# Patient Record
Sex: MALE | Race: WHITE | NOT HISPANIC OR LATINO | Employment: FULL TIME | URBAN - METROPOLITAN AREA
[De-identification: names, ages, dates, MRNs, and addresses within clinical notes are randomized per-mention and may not be internally consistent; named-entity substitution may affect disease eponyms.]

---

## 2017-02-07 ENCOUNTER — ANESTHESIA EVENT (OUTPATIENT)
Dept: GASTROENTEROLOGY | Facility: CLINIC | Age: 52
End: 2017-02-07
Payer: COMMERCIAL

## 2017-02-07 NOTE — ANESTHESIA PREPROCEDURE EVALUATION
Anesthesia Evaluation     . Pt has had prior anesthetic. Type: General and MAC    No history of anesthetic complications     ROS/MED HX    ENT/Pulmonary:     (+)Intermittent asthma , . .    Neurologic:  - neg neurologic ROS     Cardiovascular:     (+) Dyslipidemia, hypertension----. : . . . :. .       METS/Exercise Tolerance:     Hematologic:         Musculoskeletal: Comment: Cervicalgia  Achilles tendonitis  Plantar fascial fibromatosis        GI/Hepatic:  - neg GI/hepatic ROS       Renal/Genitourinary:  - ROS Renal section negative       Endo:  - neg endo ROS       Psychiatric:  - neg psychiatric ROS       Infectious Disease:  - neg infectious disease ROS       Malignancy:      - no malignancy   Other:               Physical Exam  Normal systems: cardiovascular, pulmonary and dental    Airway   Mallampati: II  TM distance: >3 FB  Neck ROM: full    Dental     Cardiovascular       Pulmonary                     Anesthesia Plan      History & Physical Review  History and physical reviewed and following examination; no interval change.    ASA Status:  2 .    NPO Status:  > 4 hours    Plan for MAC Reason for MAC:  Deep or markedly invasive procedure (G8)         Postoperative Care      Consents  Anesthetic plan, risks, benefits and alternatives discussed with:  Patient..                          .

## 2017-02-13 ENCOUNTER — SURGERY (OUTPATIENT)
Age: 52
End: 2017-02-13

## 2017-02-13 ENCOUNTER — ANESTHESIA (OUTPATIENT)
Dept: GASTROENTEROLOGY | Facility: CLINIC | Age: 52
End: 2017-02-13
Payer: COMMERCIAL

## 2017-02-13 ENCOUNTER — HOSPITAL ENCOUNTER (OUTPATIENT)
Facility: CLINIC | Age: 52
Discharge: HOME | End: 2017-02-13
Attending: SURGERY | Admitting: SURGERY
Payer: COMMERCIAL

## 2017-02-13 VITALS
HEIGHT: 67 IN | OXYGEN SATURATION: 96 % | RESPIRATION RATE: 16 BRPM | WEIGHT: 166 LBS | TEMPERATURE: 98.2 F | DIASTOLIC BLOOD PRESSURE: 100 MMHG | HEART RATE: 72 BPM | BODY MASS INDEX: 26.06 KG/M2 | SYSTOLIC BLOOD PRESSURE: 144 MMHG

## 2017-02-13 LAB — COLONOSCOPY: NORMAL

## 2017-02-13 PROCEDURE — 25800025 ZZH RX 258: Performed by: NURSE ANESTHETIST, CERTIFIED REGISTERED

## 2017-02-13 PROCEDURE — 45385 COLONOSCOPY W/LESION REMOVAL: CPT | Performed by: SURGERY

## 2017-02-13 PROCEDURE — 37000008 ZZH ANESTHESIA TECHNICAL FEE, 1ST 30 MIN: Performed by: SURGERY

## 2017-02-13 PROCEDURE — 45381 COLONOSCOPY SUBMUCOUS NJX: CPT | Mod: PT | Performed by: SURGERY

## 2017-02-13 PROCEDURE — 25000125 ZZHC RX 250: Performed by: NURSE ANESTHETIST, CERTIFIED REGISTERED

## 2017-02-13 PROCEDURE — 25000125 ZZHC RX 250: Performed by: SURGERY

## 2017-02-13 PROCEDURE — 88305 TISSUE EXAM BY PATHOLOGIST: CPT | Performed by: SURGERY

## 2017-02-13 PROCEDURE — 88305 TISSUE EXAM BY PATHOLOGIST: CPT | Mod: 26 | Performed by: SURGERY

## 2017-02-13 PROCEDURE — 45385 COLONOSCOPY W/LESION REMOVAL: CPT | Mod: PT | Performed by: SURGERY

## 2017-02-13 PROCEDURE — 45381 COLONOSCOPY SUBMUCOUS NJX: CPT | Performed by: SURGERY

## 2017-02-13 RX ORDER — PROPOFOL 10 MG/ML
INJECTION, EMULSION INTRAVENOUS PRN
Status: DISCONTINUED | OUTPATIENT
Start: 2017-02-13 | End: 2017-02-13

## 2017-02-13 RX ORDER — SODIUM CHLORIDE, SODIUM LACTATE, POTASSIUM CHLORIDE, CALCIUM CHLORIDE 600; 310; 30; 20 MG/100ML; MG/100ML; MG/100ML; MG/100ML
INJECTION, SOLUTION INTRAVENOUS CONTINUOUS
Status: DISCONTINUED | OUTPATIENT
Start: 2017-02-13 | End: 2017-02-13 | Stop reason: HOSPADM

## 2017-02-13 RX ORDER — PROPOFOL 10 MG/ML
INJECTION, EMULSION INTRAVENOUS CONTINUOUS PRN
Status: DISCONTINUED | OUTPATIENT
Start: 2017-02-13 | End: 2017-02-13

## 2017-02-13 RX ORDER — LIDOCAINE 40 MG/G
CREAM TOPICAL
Status: DISCONTINUED | OUTPATIENT
Start: 2017-02-13 | End: 2017-02-13 | Stop reason: HOSPADM

## 2017-02-13 RX ORDER — ONDANSETRON 2 MG/ML
4 INJECTION INTRAMUSCULAR; INTRAVENOUS
Status: DISCONTINUED | OUTPATIENT
Start: 2017-02-13 | End: 2017-02-13 | Stop reason: HOSPADM

## 2017-02-13 RX ORDER — LIDOCAINE HYDROCHLORIDE 10 MG/ML
INJECTION, SOLUTION INFILTRATION; PERINEURAL PRN
Status: DISCONTINUED | OUTPATIENT
Start: 2017-02-13 | End: 2017-02-13

## 2017-02-13 RX ADMIN — PROPOFOL 50 MG: 10 INJECTION, EMULSION INTRAVENOUS at 08:06

## 2017-02-13 RX ADMIN — SODIUM CHLORIDE, POTASSIUM CHLORIDE, SODIUM LACTATE AND CALCIUM CHLORIDE: 600; 310; 30; 20 INJECTION, SOLUTION INTRAVENOUS at 08:02

## 2017-02-13 RX ADMIN — PROPOFOL 200 MCG/KG/MIN: 10 INJECTION, EMULSION INTRAVENOUS at 08:06

## 2017-02-13 RX ADMIN — LIDOCAINE HYDROCHLORIDE 1 ML: 10 INJECTION, SOLUTION INFILTRATION; PERINEURAL at 07:22

## 2017-02-13 RX ADMIN — PROPOFOL 50 MG: 10 INJECTION, EMULSION INTRAVENOUS at 08:05

## 2017-02-13 RX ADMIN — LIDOCAINE HYDROCHLORIDE 50 MG: 10 INJECTION, SOLUTION INFILTRATION; PERINEURAL at 08:05

## 2017-02-13 NOTE — ANESTHESIA CARE TRANSFER NOTE
Patient: Anthony De La Torre    Procedure(s):  Colonoscopy - Wound Class: II-Clean Contaminated   - Wound Class: II-Clean Contaminated    Diagnosis: screening  Diagnosis Additional Information: No value filed.    Anesthesia Type:   MAC     Note:  Airway :Room Air  Patient transferred to:Phase II  Comments: Patient's VSS. Spontaneous respirations. Patient awake and oriented. IV patent. Report to RN.      Vitals: (Last set prior to Anesthesia Care Transfer)    CRNA VITALS  2/13/2017 0758 - 2/13/2017 0828      2/13/2017             Pulse: 83    SpO2: 94 %                Electronically Signed By: NATASHA Zarate CRNA  February 13, 2017  8:28 AM

## 2017-02-13 NOTE — ANESTHESIA POSTPROCEDURE EVALUATION
Patient: Anthony De La Torre    Procedure(s):  Colonoscopy - Wound Class: II-Clean Contaminated   - Wound Class: II-Clean Contaminated    Diagnosis:screening  Diagnosis Additional Information: No value filed.    Anesthesia Type:  MAC    Note:  Anesthesia Post Evaluation    Patient location during evaluation: Phase 2 and Bedside  Patient participation: Able to fully participate in evaluation  Level of consciousness: awake and alert  Pain management: adequate  Airway patency: patent  Cardiovascular status: acceptable  Respiratory status: acceptable  Hydration status: acceptable  PONV: none     Anesthetic complications: None          Last vitals:  Vitals:    02/13/17 0659 02/13/17 0834   BP: 134/89 113/83   Pulse: 91 76   Resp: 18 16   Temp: 36.8  C (98.2  F)    SpO2: 99% 96%         Electronically Signed By: NATASHA Zarate CRNA  February 13, 2017  8:55 AM

## 2017-02-14 LAB — COPATH REPORT: NORMAL

## 2017-04-06 DIAGNOSIS — I10 ESSENTIAL HYPERTENSION WITH GOAL BLOOD PRESSURE LESS THAN 140/90: ICD-10-CM

## 2017-04-06 NOTE — TELEPHONE ENCOUNTER
losartan      Last Written Prescription Date: 6/20/16  Last Fill Quantity: 90, # refills: 2  Last Office Visit with [facility], [facility] or [facility] prescribing provider: 6/20/16         Potassium   Date Value Ref Range Status   06/20/2016 4.0 3.4 - 5.3 mmol/L Final     Creatinine   Date Value Ref Range Status   06/20/2016 0.64 (L) 0.66 - 1.25 mg/dL Final     BP Readings from Last 3 Encounters:   02/13/17 (!) 144/100   06/20/16 124/82   03/28/16 118/80

## 2017-04-11 RX ORDER — LOSARTAN POTASSIUM 100 MG/1
100 TABLET ORAL DAILY
Qty: 90 TABLET | Refills: 0 | Status: SHIPPED | OUTPATIENT
Start: 2017-04-11 | End: 2017-06-19

## 2017-04-11 NOTE — TELEPHONE ENCOUNTER
Prescription approved per [facility] Refill Protocol. Will need labs in June.  Cecil Burgess RN

## 2017-05-28 DIAGNOSIS — E78.5 HYPERLIPIDEMIA WITH TARGET LDL LESS THAN 130: Primary | ICD-10-CM

## 2017-05-28 DIAGNOSIS — I10 ESSENTIAL HYPERTENSION WITH GOAL BLOOD PRESSURE LESS THAN 140/90: ICD-10-CM

## 2017-05-30 NOTE — TELEPHONE ENCOUNTER
HYDROCHLOROTHIAZIDE TAB 25MG        Last Written Prescription Date: 11/28/16  Last Fill Quantity: 90, # refills: 1  Last Office Visit with G, P or [facility] prescribing provider: 6/20/16       Potassium   Date Value Ref Range Status   06/20/2016 4.0 3.4 - 5.3 mmol/L Final     Creatinine   Date Value Ref Range Status   06/20/2016 0.64 (L) 0.66 - 1.25 mg/dL Final     BP Readings from Last 3 Encounters:   02/13/17 (!) 144/100   06/20/16 124/82   03/28/16 118/80

## 2017-05-31 RX ORDER — HYDROCHLOROTHIAZIDE 25 MG/1
TABLET ORAL
Qty: 90 TABLET | Refills: 0 | Status: SHIPPED | OUTPATIENT
Start: 2017-05-31 | End: 2017-06-19

## 2017-06-19 ENCOUNTER — OFFICE VISIT (OUTPATIENT)
Dept: FAMILY MEDICINE | Facility: CLINIC | Age: 52
End: 2017-06-19
Payer: COMMERCIAL

## 2017-06-19 ENCOUNTER — TELEPHONE (OUTPATIENT)
Dept: FAMILY MEDICINE | Facility: CLINIC | Age: 52
End: 2017-06-19

## 2017-06-19 VITALS
TEMPERATURE: 98.5 F | DIASTOLIC BLOOD PRESSURE: 88 MMHG | HEART RATE: 80 BPM | WEIGHT: 168.3 LBS | BODY MASS INDEX: 26.42 KG/M2 | HEIGHT: 67 IN | SYSTOLIC BLOOD PRESSURE: 144 MMHG

## 2017-06-19 DIAGNOSIS — M19.90 ARTHRITIS: Primary | ICD-10-CM

## 2017-06-19 DIAGNOSIS — N52.02 CORPORO-VENOUS OCCLUSIVE ERECTILE DYSFUNCTION: ICD-10-CM

## 2017-06-19 DIAGNOSIS — E78.5 HYPERLIPIDEMIA WITH TARGET LDL LESS THAN 130: ICD-10-CM

## 2017-06-19 DIAGNOSIS — I10 ESSENTIAL HYPERTENSION WITH GOAL BLOOD PRESSURE LESS THAN 140/90: ICD-10-CM

## 2017-06-19 DIAGNOSIS — M25.562 ACUTE PAIN OF LEFT KNEE: ICD-10-CM

## 2017-06-19 LAB — ERYTHROCYTE [SEDIMENTATION RATE] IN BLOOD BY WESTERGREN METHOD: 5 MM/H (ref 0–20)

## 2017-06-19 PROCEDURE — 36415 COLL VENOUS BLD VENIPUNCTURE: CPT | Performed by: FAMILY MEDICINE

## 2017-06-19 PROCEDURE — 86431 RHEUMATOID FACTOR QUANT: CPT | Performed by: FAMILY MEDICINE

## 2017-06-19 PROCEDURE — 86038 ANTINUCLEAR ANTIBODIES: CPT | Performed by: FAMILY MEDICINE

## 2017-06-19 PROCEDURE — 85652 RBC SED RATE AUTOMATED: CPT | Performed by: FAMILY MEDICINE

## 2017-06-19 PROCEDURE — 99396 PREV VISIT EST AGE 40-64: CPT | Performed by: FAMILY MEDICINE

## 2017-06-19 PROCEDURE — 86618 LYME DISEASE ANTIBODY: CPT | Performed by: FAMILY MEDICINE

## 2017-06-19 RX ORDER — AMLODIPINE BESYLATE 2.5 MG/1
2.5 TABLET ORAL DAILY
Qty: 90 TABLET | Refills: 3 | Status: SHIPPED | OUTPATIENT
Start: 2017-06-19 | End: 2019-06-19

## 2017-06-19 RX ORDER — SILDENAFIL CITRATE 20 MG/1
TABLET ORAL
Qty: 90 TABLET | Refills: 0 | Status: SHIPPED | OUTPATIENT
Start: 2017-06-19 | End: 2018-06-18

## 2017-06-19 RX ORDER — ALBUTEROL SULFATE 90 UG/1
2 AEROSOL, METERED RESPIRATORY (INHALATION) EVERY 6 HOURS PRN
Qty: 1 INHALER | Refills: 6 | Status: SHIPPED | OUTPATIENT
Start: 2017-06-19 | End: 2024-08-29

## 2017-06-19 RX ORDER — LOSARTAN POTASSIUM 100 MG/1
100 TABLET ORAL DAILY
Qty: 90 TABLET | Refills: 3 | Status: SHIPPED | OUTPATIENT
Start: 2017-06-19 | End: 2019-06-19

## 2017-06-19 RX ORDER — ROSUVASTATIN CALCIUM 10 MG/1
5 TABLET, COATED ORAL DAILY
Qty: 45 TABLET | Refills: 3 | Status: SHIPPED | OUTPATIENT
Start: 2017-06-19 | End: 2019-06-19

## 2017-06-19 RX ORDER — HYDROCHLOROTHIAZIDE 25 MG/1
25 TABLET ORAL DAILY
Qty: 90 TABLET | Refills: 3 | Status: SHIPPED | OUTPATIENT
Start: 2017-06-19 | End: 2017-08-29

## 2017-06-19 NOTE — MR AVS SNAPSHOT
After Visit Summary   6/19/2017    Anthony De La Torre    MRN: [id number]           Patient Information     Date Of Birth          1965        Visit Information        Provider Department      6/19/2017 10:00 AM Stanley Leroy MD [facility] [place]        Today's Diagnoses     Arthritis    -  1    Essential hypertension with goal blood pressure less than 140/90        Hyperlipidemia with target LDL less than 130        Acute pain of left knee        Corporo-venous occlusive erectile dysfunction          Care Instructions      Preventive Health Recommendations  Male Ages 50 - 64    Yearly exam:             See your health care provider every year in order to  o   Review health changes.   o   Discuss preventive care.    o   Review your medicines if your doctor has prescribed any.     Have a cholesterol test every 5 years, or more frequently if you are at risk for high cholesterol/heart disease.     Have a diabetes test (fasting glucose) every three years. If you are at risk for diabetes, you should have this test more often.     Have a colonoscopy at age 50, or have a yearly FIT test (stool test). These exams will check for colon cancer.      Talk with your health care provider about whether or not a prostate cancer screening test (PSA) is right for you.    You should be tested each year for STDs (sexually transmitted diseases), if you re at risk.     Shots: Get a flu shot each year. Get a tetanus shot every 10 years.     Nutrition:    Eat at least 5 servings of fruits and vegetables daily.     Eat whole-grain bread, whole-wheat pasta and brown rice instead of white grains and rice.     Talk to your provider about Calcium and Vitamin D.     Lifestyle    Exercise for at least 150 minutes a week (30 minutes a day, 5 days a week). This will help you control your weight and prevent disease.     Limit alcohol to one drink per day.     No smoking.     Wear sunscreen to prevent skin cancer.     See your  dentist every six months for an exam and cleaning.     See your eye doctor every 1 to 2 years.            Follow-ups after your visit        Additional Services     ORTHOPEDICS ADULT REFERRAL       Your provider has referred you to: [facility] - Dr. Ann    Please be aware that coverage of these services is subject to the terms and limitations of your health insurance plan.  Call member services at your health plan with any benefit or coverage questions.      Please bring the following to your appointment:    >>   Any x-rays, CTs or MRIs which have been performed.  Contact the facility where they were done to arrange for  prior to your scheduled appointment.    >>   List of current medications   >>   This referral request   >>   Any documents/labs given to you for this referral                  Who to contact     Normal or non-critical lab and imaging results will be communicated to you by fundfindrhart, letter or phone within 4 business days after the clinic has received the results. If you do not hear from us within 7 days, please contact the clinic through fundfindrhart or phone. If you have a critical or abnormal lab result, we will notify you by phone as soon as possible.  Submit refill requests through [employer] or call your pharmacy and they will forward the refill request to us. Please allow 3 business days for your refill to be completed.          If you need to speak with a  for additional information , please call: [phone]             Additional Information About Your Visit        [employer] Information     [employer] gives you secure access to your electronic health record. If you see a primary care provider, you can also send messages to your care team and make appointments. If you have questions, please call your primary care clinic.  If you do not have a primary care provider, please call [phone] and they will assist you.        Care EveryWhere ID     This is your Care  "EveryWhere ID. This could be used by other organizations to access your [place] medical records  [id number]        Your Vitals Were     Pulse Temperature Height BMI (Body Mass Index)          80 98.5  F (36.9  C) (Tympanic) 5' 6.75\" (1.695 m) 26.56 kg/m2         Blood Pressure from Last 3 Encounters:   06/19/17 144/88   02/13/17 (!) 144/100   06/20/16 124/82    Weight from Last 3 Encounters:   06/19/17 168 lb 4.8 oz (76.3 kg)   02/13/17 166 lb (75.3 kg)   06/20/16 170 lb 1.6 oz (77.2 kg)              We Performed the Following     Antinuclear antibody screen by EIA     ESR: Erythrocyte sedimentation rate     Lyme Disease Delisa with reflex to WB Serum     ORTHOPEDICS ADULT REFERRAL     Rheumatoid factor          Today's Medication Changes          These changes are accurate as of: 6/19/17  2:58 PM.  If you have any questions, ask your nurse or doctor.               Start taking these medicines.        Dose/Directions    sildenafil 20 MG tablet   Commonly known as:  REVATIO/VIAGRA   Used for:  Corporo-venous occlusive erectile dysfunction   Started by:  Stanley Leroy MD        2-4 tabs prior to sex   Quantity:  90 tablet   Refills:  0         These medicines have changed or have updated prescriptions.        Dose/Directions    hydrochlorothiazide 25 MG tablet   Commonly known as:  HYDRODIURIL   This may have changed:  See the new instructions.   Used for:  Essential hypertension with goal blood pressure less than 140/90   Changed by:  Stanley Leroy MD        Dose:  25 mg   Take 1 tablet (25 mg) by mouth daily   Quantity:  90 tablet   Refills:  3            Where to get your medicines      These medications were sent to [facility] - [id number]  [address] [facility] [place] [address]     Phone:  [phone]     albuterol 108 (90 BASE) MCG/ACT Inhaler    amLODIPine 2.5 MG tablet    hydrochlorothiazide 25 MG tablet    losartan 100 MG tablet    rosuvastatin 10 MG tablet       "   Some of these will need a paper prescription and others can be bought over the counter.  Ask your nurse if you have questions.     Bring a paper prescription for each of these medications     sildenafil 20 MG tablet                Primary Care Provider Office Phone # Fax #    Stanley Leroy [phone]       [facility] [address]  [facility] [address]        Thank you!     Thank you for choosing [facility]  for your care. Our goal is always to provide you with excellent care. Hearing back from our patients is one way we can continue to improve our services. Please take a few minutes to complete the written survey that you may receive in the mail after your visit with us. Thank you!             Your Updated Medication List - Protect others around you: Learn how to safely use, store and throw away your medicines at www.disposemymeds.org.          This list is accurate as of: 6/19/17  2:58 PM.  Always use your most recent med list.                   Brand Name Dispense Instructions for use    albuterol 108 (90 BASE) MCG/ACT Inhaler    PROAIR HFA/PROVENTIL HFA/VENTOLIN HFA    1 Inhaler    Inhale 2 puffs into the lungs every 6 hours as needed for shortness of breath / dyspnea       amLODIPine 2.5 MG tablet    NORVASC    90 tablet    Take 1 tablet (2.5 mg) by mouth daily       ascorbic acid 1000 MG Tabs    vitamin C     Take 1,000 mg by mouth daily.       aspirin 81 MG chewable tablet      Take 81 mg by mouth daily.       BIOFLEX PO      Take 1-2 tablets by mouth daily.       fish oil-omega-3 fatty acids 1000 MG capsule      Take 2 g by mouth daily.       fluticasone 50 MCG/ACT spray    FLONASE         hydrochlorothiazide 25 MG tablet    HYDRODIURIL    90 tablet    Take 1 tablet (25 mg) by mouth daily       losartan 100 MG tablet    COZAAR    90 tablet    Take 1 tablet (100 mg) by mouth daily       rosuvastatin 10 MG tablet    CRESTOR    45 tablet    Take 0.5 tablets (5 mg) by  mouth daily       sildenafil 100 MG cap/tab    VIAGRA    6 tablet    Take 0.5-1 tablets ( mg) by mouth daily as needed for erectile dysfunction Take 30 min to 4 hours before intercourse.  Never use with nitroglycerin, terazosin or doxazosin.       sildenafil 20 MG tablet    REVATIO/VIAGRA    90 tablet    2-4 tabs prior to sex

## 2017-06-19 NOTE — PROGRESS NOTES
SUBJECTIVE:     CC: Anthony De La Torre is an 51 year old male who presents for preventative health visit.     Healthy Habits:    Do you get at least three servings of calcium containing foods daily (dairy, green leafy vegetables, etc.)? yes    Amount of exercise or daily activities, outside of work: 30 minute(s) per day    Problems taking medications regularly No    Medication side effects: No    Have you had an eye exam in the past two years? Due for exam     Do you see a dentist twice per year? yes    Do you have sleep apnea, excessive snoring or daytime drowsiness?yes      Today's PHQ-2 Score:   PHQ-2 ( 1999 Pfizer) 6/19/2017 6/20/2016   Q1: Little interest or pleasure in doing things 0 0   Q2: Feeling down, depressed or hopeless 0 0   PHQ-2 Score 0 0       Abuse: Current or Past(Physical, Sexual or Emotional)- No  Do you feel safe in your environment - Yes    Social History   Substance Use Topics     Smoking status: Current Every Day Smoker     Packs/day: 1.00     Years: 20.00     Types: Cigarettes     Smokeless tobacco: Never Used      Comment: 1.00 ppd or less     Alcohol use Yes      Comment: Occasional - 6-7 drinks per week     The patient does not drink >3 drinks per day nor >7 drinks per week.    Last PSA:   PSA   Date Value Ref Range Status   01/13/2014 0.44 0 - 4 ug/L Final       Recent Labs   Lab Test  06/20/16   1044  06/17/15   1022  07/03/14   0947   CHOL  218*  205*  165   HDL  44  44  39*   LDL  129*  135*  94   TRIG  224*  129  158*   CHOLHDLRATIO   --   4.7  4.0   NHDL  174*   --    --        Reviewed orders with patient. Reviewed health maintenance and updated orders accordingly - Yes    Reviewed and updated as needed this visit by clinical staff  Tobacco  Allergies  Med Hx  Surg Hx  Fam Hx  Soc Hx        Reviewed and updated as needed this visit by Provider            ROS:  C: NEGATIVE for fever, chills, change in weight  I: NEGATIVE for worrisome rashes, moles or lesions  E: NEGATIVE for  "vision changes or irritation  ENT: NEGATIVE for ear, mouth and throat problems  R: NEGATIVE for significant cough or SOB  CV: NEGATIVE for chest pain, palpitations or peripheral edema  GI: NEGATIVE for nausea, abdominal pain, heartburn, or change in bowel habits   male: negative for dysuria, hematuria, decreased urinary stream, erectile dysfunction, urethral discharge  M: NEGATIVE for significant arthralgias or myalgia  N: NEGATIVE for weakness, dizziness or paresthesias  P: NEGATIVE for changes in mood or affect    Problem list, Medication list, Allergies, and Medical/Social/Surgical histories reviewed in [facility] and updated as appropriate.  OBJECTIVE:     /88 (BP Location: Right arm, Patient Position: Chair, Cuff Size: Adult Regular)  Pulse 80  Temp 98.5  F (36.9  C) (Tympanic)  Ht 5' 6.75\" (1.695 m)  Wt 168 lb 4.8 oz (76.3 kg)  BMI 26.56 kg/m2     EXAM:  GENERAL: healthy, alert and no distress  NECK: no adenopathy, no asymmetry, masses, or scars and thyroid normal to palpation  RESP: lungs clear to auscultation - no rales, rhonchi or wheezes  CV: regular rate and rhythm, normal S1 S2, no S3 or S4, no murmur, click or rub, no peripheral edema and peripheral pulses strong  ABDOMEN: soft, nontender, no hepatosplenomegaly, no masses and bowel sounds normal  MS: no gross musculoskeletal defects noted, no edema    ASSESSMENT/PLAN:       1. Essential hypertension with goal blood pressure less than 140/90    - hydrochlorothiazide (HYDRODIURIL) 25 MG tablet; Take 1 tablet (25 mg) by mouth daily  Dispense: 90 tablet; Refill: 3  - losartan (COZAAR) 100 MG tablet; Take 1 tablet (100 mg) by mouth daily  Dispense: 90 tablet; Refill: 3  - amLODIPine (NORVASC) 2.5 MG tablet; Take 1 tablet (2.5 mg) by mouth daily  Dispense: 90 tablet; Refill: 3  - albuterol (PROAIR HFA/PROVENTIL HFA/VENTOLIN HFA) 108 (90 BASE) MCG/ACT Inhaler; Inhale 2 puffs into the lungs every 6 hours as needed for shortness of breath / dyspnea  " "Dispense: 1 Inhaler; Refill: 6    2. Hyperlipidemia with target LDL less than 130    - rosuvastatin (CRESTOR) 10 MG tablet; Take 0.5 tablets (5 mg) by mouth daily  Dispense: 45 tablet; Refill: 3    3. Arthritis    - Rheumatoid factor  - Lyme Disease Delisa with reflex to WB Serum  - Antinuclear antibody screen by EIA  - ESR: Erythrocyte sedimentation rate    COUNSELING:  Reviewed preventive health counseling, as reflected in patient instructions       Consider AAA screening for ages 65-75 and smoking history       Regular exercise       Healthy diet/nutrition       Vision screening       Hearing screening       Colon cancer screening       Prostate cancer screening     reports that he has been smoking Cigarettes.  He has a 20.00 pack-year smoking history. He has never used smokeless tobacco.    Estimated body mass index is 26.56 kg/(m^2) as calculated from the following:    Height as of this encounter: 5' 6.75\" (1.695 m).    Weight as of this encounter: 168 lb 4.8 oz (76.3 kg).     Counseling Resources:  ATP IV Guidelines  Pooled Cohorts Equation Calculator  FRAX Risk Assessment  ICSI Preventive Guidelines  Dietary Guidelines for Americans, 2010  [email or web address]'s MyPlate  ASA Prophylaxis  Lung CA Screening    Stanley Leroy MD  [facility]  "

## 2017-06-20 LAB
ANA SER QL IA: NORMAL
B BURGDOR IGG+IGM SER QL: 0.02 (ref 0–0.89)
RHEUMATOID FACT SER NEPH-ACNC: <20 IU/ML (ref 0–20)

## 2017-07-10 DIAGNOSIS — I10 ESSENTIAL HYPERTENSION WITH GOAL BLOOD PRESSURE LESS THAN 140/90: ICD-10-CM

## 2017-07-10 NOTE — TELEPHONE ENCOUNTER
LOSARTAN TABS 100MG        Last Written Prescription Date: 6/19/17  Last Fill Quantity: 90, # refills: 3  [place] [facility] [place] [facility] or [facility] prescribing provider: 6/19/17       Potassium   Date Value Ref Range Status   06/20/2016 4.0 3.4 - 5.3 mmol/L Final     Creatinine   Date Value Ref Range Status   06/20/2016 0.64 (L) 0.66 - 1.25 mg/dL Final     BP Readings from Last 3 Encounters:   06/19/17 144/88   02/13/17 (!) 144/100   06/20/16 124/82

## 2017-07-11 RX ORDER — LOSARTAN POTASSIUM 100 MG/1
TABLET ORAL
Qty: 90 TABLET | Refills: 3 | Status: SHIPPED | OUTPATIENT
Start: 2017-07-11 | End: 2018-06-18

## 2017-08-16 DIAGNOSIS — I10 ESSENTIAL HYPERTENSION WITH GOAL BLOOD PRESSURE LESS THAN 140/90: ICD-10-CM

## 2017-08-16 NOTE — TELEPHONE ENCOUNTER
AMLODIPINE BESYLATE TABS 2.5MG        Last Written Prescription Date: 6/19/17  Last Fill Quantity: 90, # refills: 3    Last Office Visit with G, P or [facility] prescribing provider:  6/19/17   Future Office Visit:        BP Readings from Last 3 Encounters:   06/19/17 144/88   02/13/17 (!) 144/100   06/20/16 124/82

## 2017-08-17 RX ORDER — AMLODIPINE BESYLATE 2.5 MG/1
TABLET ORAL
Qty: 90 TABLET | Refills: 2 | Status: SHIPPED | OUTPATIENT
Start: 2017-08-17 | End: 2019-06-19

## 2017-08-17 NOTE — TELEPHONE ENCOUNTER
Prescription approved per [facility] Refill Protocol or patient Primary care provider (PCP)  FRANDY Graf RN/Yahir Carpio

## 2017-08-29 DIAGNOSIS — I10 ESSENTIAL HYPERTENSION WITH GOAL BLOOD PRESSURE LESS THAN 140/90: ICD-10-CM

## 2017-08-29 RX ORDER — HYDROCHLOROTHIAZIDE 25 MG/1
TABLET ORAL
Qty: 90 TABLET | Refills: 2 | Status: SHIPPED | OUTPATIENT
Start: 2017-08-29 | End: 2018-05-26

## 2017-08-29 NOTE — TELEPHONE ENCOUNTER
HYDROCHLOROTHIAZIDE TAB 25MG        Last Written Prescription Date: 6/19/17  Last Fill Quantity: 90, # refills: 3  Last Office Visit with G, P or [facility] prescribing provider: 6/19/17       Potassium   Date Value Ref Range Status   06/20/2016 4.0 3.4 - 5.3 mmol/L Final     Creatinine   Date Value Ref Range Status   06/20/2016 0.64 (L) 0.66 - 1.25 mg/dL Final     BP Readings from Last 3 Encounters:   06/19/17 144/88   02/13/17 (!) 144/100   06/20/16 124/82

## 2018-05-13 DIAGNOSIS — I10 HTN (HYPERTENSION): Primary | ICD-10-CM

## 2018-05-14 RX ORDER — AMLODIPINE BESYLATE 2.5 MG/1
TABLET ORAL
Qty: 90 TABLET | Refills: 0 | Status: SHIPPED | OUTPATIENT
Start: 2018-05-14 | End: 2018-06-18

## 2018-05-14 NOTE — TELEPHONE ENCOUNTER
"Requested Prescriptions   Pending Prescriptions Disp Refills     amLODIPine (NORVASC) 2.5 MG tablet [Pharmacy Med Name: AMLODIPINE BESYLATE TABS 2.5MG] 90 tablet 2     Sig: TAKE 1 TABLET DAILY    Calcium Channel Blockers Protocol  Failed    5/13/2018 11:14 PM       Failed - Blood pressure under 140/90 in past 12 months    BP Readings from Last 3 Encounters:   06/19/17 144/88   02/13/17 (!) 144/100   06/20/16 124/82                Failed - Normal serum creatinine on file in past 12 months    Recent Labs   Lab Test  06/20/16   1044   CR  0.64*            Passed - Recent (12 mo) or future (30 days) visit within the authorizing provider's specialty    Patient had office visit in the last 12 months or has a visit in the next 30 days with authorizing provider or within the authorizing provider's specialty.  See \"Patient Info\" tab in inbasket, or \"Choose Columns\" in Meds & Orders section of the refill encounter.           Passed - Patient is age 18 or older        Last Written Prescription Date:  5/13/18  Last Fill Quantity: 90,  # refills: 2   Last office visit: 6/19/2017 with prescribing provider:  Mariaa   Future Office Visit:      "

## 2018-05-14 NOTE — TELEPHONE ENCOUNTER
Medication is being filled for 1 time refill only due to:  Patient needs labs lipids,bmp. Future labs ordered yes. Patient needs to be seen because it will be one year.   Cecil Burgess RN

## 2018-05-26 DIAGNOSIS — I10 ESSENTIAL HYPERTENSION WITH GOAL BLOOD PRESSURE LESS THAN 140/90: ICD-10-CM

## 2018-05-29 NOTE — TELEPHONE ENCOUNTER
"HYDROCHLOROTHIAZIDE TAB 25MG        Last Written Prescription Date:  8/29/17  Last Fill Quantity: 90,   # refills: 2  Last Office Visit: 6/19/17  Future Office visit:       Requested Prescriptions   Pending Prescriptions Disp Refills     hydrochlorothiazide (HYDRODIURIL) 25 MG tablet [Pharmacy Med Name: HYDROCHLOROTHIAZIDE TAB 25MG] 90 tablet 2     Sig: TAKE 1 TABLET DAILY    Diuretics (Including Combos) Protocol Failed    5/26/2018 12:51 AM       Failed - Blood pressure under 140/90 in past 12 months    BP Readings from Last 3 Encounters:   06/19/17 144/88   02/13/17 (!) 144/100   06/20/16 124/82                Failed - Normal serum creatinine on file in past 12 months    Recent Labs   Lab Test  06/20/16   1044   CR  0.64*             Failed - Normal serum potassium on file in past 12 months    Recent Labs   Lab Test  06/20/16   1044   POTASSIUM  4.0                   Failed - Normal serum sodium on file in past 12 months    Recent Labs   Lab Test  06/20/16   1044   NA  139             Passed - Recent (12 mo) or future (30 days) visit within the authorizing provider's specialty    Patient had office visit in the last 12 months or has a visit in the next 30 days with authorizing provider or within the authorizing provider's specialty.  See \"Patient Info\" tab in inbasket, or \"Choose Columns\" in Meds & Orders section of the refill encounter.           Passed - Patient is age 18 or older          "

## 2018-05-30 RX ORDER — HYDROCHLOROTHIAZIDE 25 MG/1
TABLET ORAL
Qty: 90 TABLET | Refills: 0 | Status: SHIPPED | OUTPATIENT
Start: 2018-05-30 | End: 2018-06-18

## 2018-06-18 ENCOUNTER — OFFICE VISIT (OUTPATIENT)
Dept: FAMILY MEDICINE | Facility: CLINIC | Age: 53
End: 2018-06-18
Payer: COMMERCIAL

## 2018-06-18 VITALS
TEMPERATURE: 97.9 F | BODY MASS INDEX: 26.67 KG/M2 | HEIGHT: 67 IN | WEIGHT: 169.9 LBS | SYSTOLIC BLOOD PRESSURE: 110 MMHG | HEART RATE: 84 BPM | DIASTOLIC BLOOD PRESSURE: 73 MMHG

## 2018-06-18 DIAGNOSIS — R53.83 FATIGUE, UNSPECIFIED TYPE: ICD-10-CM

## 2018-06-18 DIAGNOSIS — I10 ESSENTIAL HYPERTENSION: ICD-10-CM

## 2018-06-18 DIAGNOSIS — Z11.59 NEED FOR HEPATITIS C SCREENING TEST: ICD-10-CM

## 2018-06-18 DIAGNOSIS — I10 ESSENTIAL HYPERTENSION WITH GOAL BLOOD PRESSURE LESS THAN 140/90: ICD-10-CM

## 2018-06-18 DIAGNOSIS — Z00.00 ROUTINE GENERAL MEDICAL EXAMINATION AT A HEALTH CARE FACILITY: Primary | ICD-10-CM

## 2018-06-18 DIAGNOSIS — N52.02 CORPORO-VENOUS OCCLUSIVE ERECTILE DYSFUNCTION: ICD-10-CM

## 2018-06-18 DIAGNOSIS — M25.562 CHRONIC PAIN OF LEFT KNEE: ICD-10-CM

## 2018-06-18 DIAGNOSIS — G89.29 CHRONIC PAIN OF LEFT KNEE: ICD-10-CM

## 2018-06-18 DIAGNOSIS — Z00.01 ENCOUNTER FOR ROUTINE ADULT MEDICAL EXAM WITH ABNORMAL FINDINGS: ICD-10-CM

## 2018-06-18 DIAGNOSIS — Z13.220 LIPID SCREENING: ICD-10-CM

## 2018-06-18 DIAGNOSIS — Z11.4 SCREENING FOR HIV (HUMAN IMMUNODEFICIENCY VIRUS): ICD-10-CM

## 2018-06-18 LAB
CHOLEST SERPL-MCNC: 181 MG/DL
HDLC SERPL-MCNC: 38 MG/DL
LDLC SERPL CALC-MCNC: 110 MG/DL
NONHDLC SERPL-MCNC: 143 MG/DL
TRIGL SERPL-MCNC: 167 MG/DL

## 2018-06-18 PROCEDURE — 86803 HEPATITIS C AB TEST: CPT | Performed by: FAMILY MEDICINE

## 2018-06-18 PROCEDURE — 80061 LIPID PANEL: CPT | Performed by: FAMILY MEDICINE

## 2018-06-18 PROCEDURE — 84403 ASSAY OF TOTAL TESTOSTERONE: CPT | Performed by: FAMILY MEDICINE

## 2018-06-18 PROCEDURE — 36415 COLL VENOUS BLD VENIPUNCTURE: CPT | Performed by: FAMILY MEDICINE

## 2018-06-18 PROCEDURE — 87389 HIV-1 AG W/HIV-1&-2 AB AG IA: CPT | Performed by: FAMILY MEDICINE

## 2018-06-18 PROCEDURE — 99396 PREV VISIT EST AGE 40-64: CPT | Performed by: FAMILY MEDICINE

## 2018-06-18 RX ORDER — HYDROCHLOROTHIAZIDE 25 MG/1
25 TABLET ORAL DAILY
Qty: 90 TABLET | Refills: 3 | Status: SHIPPED | OUTPATIENT
Start: 2018-06-18 | End: 2019-06-19

## 2018-06-18 RX ORDER — AMLODIPINE BESYLATE 2.5 MG/1
2.5 TABLET ORAL DAILY
Qty: 90 TABLET | Refills: 0 | Status: SHIPPED | OUTPATIENT
Start: 2018-06-18 | End: 2019-06-19

## 2018-06-18 RX ORDER — SILDENAFIL CITRATE 20 MG/1
TABLET ORAL
Qty: 90 TABLET | Refills: 3 | Status: SHIPPED | OUTPATIENT
Start: 2018-06-18 | End: 2019-06-19

## 2018-06-18 RX ORDER — LOSARTAN POTASSIUM 100 MG/1
100 TABLET ORAL DAILY
Qty: 90 TABLET | Refills: 3 | Status: SHIPPED | OUTPATIENT
Start: 2018-06-18 | End: 2018-07-06

## 2018-06-18 ASSESSMENT — PAIN SCALES - GENERAL: PAINLEVEL: MODERATE PAIN (5)

## 2018-06-18 NOTE — MR AVS SNAPSHOT
After Visit Summary   6/18/2018    Anthony De La Torre    MRN: [id number]           Patient Information     Date Of Birth          1965        Visit Information        Provider Department      6/18/2018 9:20 AM Stanley Leroy MD [facility] [place]        Today's Diagnoses     Routine general medical examination at a health care facility    -  1    Encounter for routine adult medical exam with abnormal findings        Need for hepatitis C screening test        Screening for HIV (human immunodeficiency virus)        Lipid screening        Chronic pain of left knee        Essential hypertension with goal blood pressure less than 140/90        Essential hypertension        Corporo-venous occlusive erectile dysfunction        Fatigue, unspecified type          Care Instructions      Preventive Health Recommendations  Male Ages 50 - 64    Yearly exam:             See your health care provider every year in order to  o   Review health changes.   o   Discuss preventive care.    o   Review your medicines if your doctor has prescribed any.     Have a cholesterol test every 5 years, or more frequently if you are at risk for high cholesterol/heart disease.     Have a diabetes test (fasting glucose) every three years. If you are at risk for diabetes, you should have this test more often.     Have a colonoscopy at age 50, or have a yearly FIT test (stool test). These exams will check for colon cancer.      Talk with your health care provider about whether or not a prostate cancer screening test (PSA) is right for you.    You should be tested each year for STDs (sexually transmitted diseases), if you re at risk.     Shots: Get a flu shot each year. Get a tetanus shot every 10 years.     Nutrition:    Eat at least 5 servings of fruits and vegetables daily.     Eat whole-grain bread, whole-wheat pasta and brown rice instead of white grains and rice.     Talk to your provider about Calcium and Vitamin D.      Lifestyle    Exercise for at least 150 minutes a week (30 minutes a day, 5 days a week). This will help you control your weight and prevent disease.     Limit alcohol to one drink per day.     No smoking.     Wear sunscreen to prevent skin cancer.     See your dentist every six months for an exam and cleaning.     See your eye doctor every 1 to 2 years.            Follow-ups after your visit        Additional Services     ORTHOPEDICS ADULT REFERRAL       Your provider has referred you to: [facility] [place] [phone] [email or web address]    Please be aware that coverage of these services is subject to the terms and limitations of your health insurance plan.  Call member services at your health plan with any benefit or coverage questions.      Please bring the following to your appointment:    >>   Any x-rays, CTs or MRIs which have been performed.  Contact the facility where they were done to arrange for  prior to your scheduled appointment.    >>   List of current medications   >>   This referral request   >>   Any documents/labs given to you for this referral                  Who to contact     Normal or non-critical lab and imaging results will be communicated to you by Flipzuhart, letter or phone within 4 business days after the clinic has received the results. If you do not hear from us within 7 days, please contact the clinic through Flipzuhart or phone. If you have a critical or abnormal lab result, we will notify you by phone as soon as possible.  Submit refill requests through BotScanner or call your pharmacy and they will forward the refill request to us. Please allow 3 business days for your refill to be completed.          If you need to speak with a  for additional information , please call: [phone]             Additional Information About Your Visit        BotScanner Information     BotScanner gives you secure access to your electronic health  "record. If you see a primary care provider, you can also send messages to your care team and make appointments. If you have questions, please call your primary care clinic.  If you do not have a primary care provider, please call [phone] and they will assist you.        Care EveryWhere ID     This is your Care EveryWhere ID. This could be used by other organizations to access your [place] medical records  [id number]        Your Vitals Were     Pulse Temperature Height BMI (Body Mass Index)          84 97.9  F (36.6  C) (Tympanic) 5' 6.75\" (1.695 m) 26.81 kg/m2         Blood Pressure from Last 3 Encounters:   06/18/18 110/73   06/19/17 144/88   02/13/17 (!) 144/100    Weight from Last 3 Encounters:   06/18/18 169 lb 14.4 oz (77.1 kg)   06/19/17 168 lb 4.8 oz (76.3 kg)   02/13/17 166 lb (75.3 kg)              We Performed the Following     Hepatitis C Screen Reflex to HCV RNA Quant and Genotype     HIV Screening     Lipid panel reflex to direct LDL Fasting     ORTHOPEDICS ADULT REFERRAL     Testosterone, total          Today's Medication Changes          These changes are accurate as of 6/18/18 10:26 AM.  If you have any questions, ask your nurse or doctor.               These medicines have changed or have updated prescriptions.        Dose/Directions    * amLODIPine 2.5 MG tablet   Commonly known as:  NORVASC   This may have changed:  Another medication with the same name was changed. Make sure you understand how and when to take each.   Used for:  Essential hypertension with goal blood pressure less than 140/90   Changed by:  Stanley Leroy MD        Dose:  2.5 mg   Take 1 tablet (2.5 mg) by mouth daily   Quantity:  90 tablet   Refills:  3       * amLODIPine 2.5 MG tablet   Commonly known as:  NORVASC   This may have changed:  Another medication with the same name was changed. Make sure you understand how and when to take each.   Used for:  Essential hypertension with goal blood pressure less than 140/90 "   Changed by:  Stanley Leroy MD        TAKE 1 TABLET DAILY   Quantity:  90 tablet   Refills:  2       * amLODIPine 2.5 MG tablet   Commonly known as:  NORVASC   This may have changed:  See the new instructions.   Used for:  Essential hypertension   Changed by:  Stanley Leroy MD        Dose:  2.5 mg   Take 1 tablet (2.5 mg) by mouth daily   Quantity:  90 tablet   Refills:  0       hydrochlorothiazide 25 MG tablet   Commonly known as:  HYDRODIURIL   This may have changed:  See the new instructions.   Used for:  Essential hypertension with goal blood pressure less than 140/90   Changed by:  Stanley Leroy MD        Dose:  25 mg   Take 1 tablet (25 mg) by mouth daily   Quantity:  90 tablet   Refills:  3       * losartan 100 MG tablet   Commonly known as:  COZAAR   This may have changed:  Another medication with the same name was changed. Make sure you understand how and when to take each.   Used for:  Essential hypertension with goal blood pressure less than 140/90   Changed by:  Stanley Leroy MD        Dose:  100 mg   Take 1 tablet (100 mg) by mouth daily   Quantity:  90 tablet   Refills:  3       * losartan 100 MG tablet   Commonly known as:  COZAAR   This may have changed:  See the new instructions.   Used for:  Essential hypertension with goal blood pressure less than 140/90   Changed by:  Stanley Leroy MD        Dose:  100 mg   Take 1 tablet (100 mg) by mouth daily   Quantity:  90 tablet   Refills:  3       * Notice:  This list has 5 medication(s) that are the same as other medications prescribed for you. Read the directions carefully, and ask your doctor or other care provider to review them with you.         Where to get your medicines      These medications were sent to [place], [place] - [address] [facility] [address]  [address] [facility] [address] [facility] [address]     Phone:  [phone]     amLODIPine 2.5 MG tablet    hydrochlorothiazide 25 MG tablet    losartan 100 MG tablet    sildenafil  20 MG tablet                Primary Care Provider Office Phone # Fax #    Stanley Leroy [phone] [address] [facility] [address]        Equal Access to Services     SHIKHAKATHERINE SULY : Edu chris vasquez harsha Gregg, waaxda luqadaha, qaybta kaalmada lucy, bhaskar peraltaruss kevin. So [facility] [phone].    ATENCIÓN: Si habla español, tiene a sullivan disposición servicios gratuitos de asistencia lingüística. Llame al [phone].    We comply with applicable federal civil rights laws and [place] laws. We do not discriminate on the basis of race, color, national origin, age, disability, sex, sexual orientation, or gender identity.            Thank you!     Thank you for choosing [facility]  for your care. Our goal is always to provide you with excellent care. Hearing back from our patients is one way we can continue to improve our services. Please take a few minutes to complete the written survey that you may receive in the mail after your visit with us. Thank you!             Your Updated Medication List - Protect others around you: Learn how to safely use, store and throw away your medicines at www.disposemymeds.org.          This list is accurate as of 6/18/18 10:26 AM.  Always use your most recent med list.                   Brand Name Dispense Instructions for use Diagnosis    albuterol 108 (90 Base) MCG/ACT Inhaler    PROAIR HFA/PROVENTIL HFA/VENTOLIN HFA    1 Inhaler    Inhale 2 puffs into the lungs every 6 hours as needed for shortness of breath / dyspnea    Essential hypertension with goal blood pressure less than 140/90       * amLODIPine 2.5 MG tablet    NORVASC    90 tablet    Take 1 tablet (2.5 mg) by mouth daily    Essential hypertension with goal blood pressure less than 140/90       * amLODIPine 2.5 MG tablet    NORVASC    90 tablet    TAKE 1 TABLET DAILY    Essential hypertension with goal blood pressure less than 140/90       * amLODIPine 2.5  MG tablet    NORVASC    90 tablet    Take 1 tablet (2.5 mg) by mouth daily    Essential hypertension       ascorbic acid 1000 MG Tabs    vitamin C     Take 1,000 mg by mouth daily.        aspirin 81 MG chewable tablet      Take 81 mg by mouth daily.        BIOFLEX PO      Take 1-2 tablets by mouth daily.        cetirizine HCl 10 MG Caps      Take 25 mg by mouth daily as needed        fish oil-omega-3 fatty acids 1000 MG capsule      Take 2 g by mouth daily.        fluticasone 50 MCG/ACT spray    FLONASE          hydrochlorothiazide 25 MG tablet    HYDRODIURIL    90 tablet    Take 1 tablet (25 mg) by mouth daily    Essential hypertension with goal blood pressure less than 140/90       * losartan 100 MG tablet    COZAAR    90 tablet    Take 1 tablet (100 mg) by mouth daily    Essential hypertension with goal blood pressure less than 140/90       * losartan 100 MG tablet    COZAAR    90 tablet    Take 1 tablet (100 mg) by mouth daily    Essential hypertension with goal blood pressure less than 140/90       rosuvastatin 10 MG tablet    CRESTOR    45 tablet    Take 0.5 tablets (5 mg) by mouth daily    Hyperlipidemia with target LDL less than 130       sildenafil 100 MG tablet    VIAGRA    6 tablet    Take 0.5-1 tablets ( mg) by mouth daily as needed for erectile dysfunction Take 30 min to 4 hours before intercourse.  Never use with nitroglycerin, terazosin or doxazosin.    ED (erectile dysfunction)       sildenafil 20 MG tablet    REVATIO    90 tablet    2-4 tabs prior to sex    Corporo-venous occlusive erectile dysfunction       * Notice:  This list has 5 medication(s) that are the same as other medications prescribed for you. Read the directions carefully, and ask your doctor or other care provider to review them with you.

## 2018-06-18 NOTE — PROGRESS NOTES
SUBJECTIVE:   CC: Anthony De La Torre is an 52 year old male who presents for preventative health visit.     Healthy Habits:    Do you get at least three servings of calcium containing foods daily (dairy, green leafy vegetables, etc.)? yes    Amount of exercise or daily activities, outside of work: 4-5 day(s) per week    Problems taking medications regularly No    Medication side effects: No    Have you had an eye exam in the past two years? yes    Do you see a dentist twice per year? yes    Do you have sleep apnea, excessive snoring or daytime drowsiness?no     Wt Readings from Last 10 Encounters:   06/18/18 169 lb 14.4 oz (77.1 kg)   06/19/17 168 lb 4.8 oz (76.3 kg)   02/13/17 166 lb (75.3 kg)   06/20/16 170 lb 1.6 oz (77.2 kg)   06/17/15 167 lb 9.6 oz (76 kg)   07/03/14 169 lb 6.4 oz (76.8 kg)   01/15/14 170 lb (77.1 kg)   01/13/14 170 lb 3.2 oz (77.2 kg)   01/15/13 171 lb (77.6 kg)   10/17/12 173 lb 11.2 oz (78.8 kg)     Patient informed that anything we discuss that is not related to preventative medicine, may be billed for; patient verbalizes understanding.            Today's PHQ-2 Score:   PHQ-2 ( 1999 Pfizer) 6/18/2018 6/19/2017   Q1: Little interest or pleasure in doing things 0 0   Q2: Feeling down, depressed or hopeless 1 0   PHQ-2 Score 1 0       Abuse: Current or Past(Physical, Sexual or Emotional)- No  Do you feel safe in your environment - Yes    Social History   Substance Use Topics     Smoking status: Current Every Day Smoker     Packs/day: 1.00     Years: 20.00     Types: Cigarettes     Smokeless tobacco: Never Used      Comment: 1.00 ppd or less     Alcohol use Yes      Comment: Occasional - 6-7 drinks per week      If you drink alcohol do you typically have >3 drinks per day or >7 drinks per week? No                      Last PSA:   PSA   Date Value Ref Range Status   01/13/2014 0.44 0 - 4 ug/L Final       Reviewed orders with patient. Reviewed health maintenance and updated orders accordingly -  "Yes    Reviewed and updated as needed this visit by clinical staff  Tobacco  Allergies  Meds  Med Hx  Surg Hx  Fam Hx  Soc Hx        Reviewed and updated as needed this visit by Provider          ROS:  CONSTITUTIONAL: NEGATIVE for fever, chills, change in weight  INTEGUMENTARY/SKIN: NEGATIVE for worrisome rashes, moles or lesions  EYES: NEGATIVE for vision changes or irritation  ENT: NEGATIVE for ear, mouth and throat problems  RESP: NEGATIVE for significant cough or SOB  CV: NEGATIVE for chest pain, palpitations or peripheral edema  GI: NEGATIVE for nausea, abdominal pain, heartburn, or change in bowel habits   male: negative for dysuria, hematuria, decreased urinary stream, erectile dysfunction, urethral discharge  MUSCULOSKELETAL: NEGATIVE for significant arthralgias or myalgia  NEURO: NEGATIVE for weakness, dizziness or paresthesias  PSYCHIATRIC: NEGATIVE for changes in mood or affect    OBJECTIVE:   Ht 5' 6.75\" (1.695 m)  Wt 169 lb 14.4 oz (77.1 kg)  BMI 26.81 kg/m2  EXAM:  GENERAL: healthy, alert and no distress  NECK: no adenopathy, no asymmetry, masses, or scars and thyroid normal to palpation  RESP: lungs clear to auscultation - no rales, rhonchi or wheezes  CV: regular rate and rhythm, normal S1 S2, no S3 or S4, no murmur, click or rub, no peripheral edema and peripheral pulses strong  ABDOMEN: soft, nontender, no hepatosplenomegaly, no masses and bowel sounds normal  MS: no gross musculoskeletal defects noted, no edema    ASSESSMENT/PLAN:   1. Routine general medical examination at a health care facility      2. Encounter for routine adult medical exam with abnormal findings      3. Need for hepatitis C screening test    - Hepatitis C Screen Reflex to HCV RNA Quant and Genotype    4. Screening for HIV (human immunodeficiency virus)    - HIV Screening    5. Lipid screening    - Lipid panel reflex to direct LDL Fasting    6. Chronic pain of left knee    - ORTHOPEDICS ADULT REFERRAL    7. Essential " "hypertension with goal blood pressure less than 140/90    - hydrochlorothiazide (HYDRODIURIL) 25 MG tablet; Take 1 tablet (25 mg) by mouth daily  Dispense: 90 tablet; Refill: 3  - losartan (COZAAR) 100 MG tablet; Take 1 tablet (100 mg) by mouth daily  Dispense: 90 tablet; Refill: 3    8. Essential hypertension  Good control.  Continue currant medications, continue to monito    - amLODIPine (NORVASC) 2.5 MG tablet; Take 1 tablet (2.5 mg) by mouth daily  Dispense: 90 tablet; Refill: 0    9. Corporo-venous occlusive erectile dysfunction    - sildenafil (REVATIO) 20 MG tablet; 2-4 tabs prior to sex  Dispense: 90 tablet; Refill: 3    10. Fatigue, unspecified type    - Testosterone, total    COUNSELING:  Reviewed preventive health counseling, as reflected in patient instructions       Regular exercise       Healthy diet/nutrition       Vision screening       Hearing screening       Colon cancer screening       Prostate cancer screening       reports that he has been smoking Cigarettes.  He has a 20.00 pack-year smoking history. He has never used smokeless tobacco.    Estimated body mass index is 26.81 kg/(m^2) as calculated from the following:    Height as of this encounter: 5' 6.75\" (1.695 m).    Weight as of this encounter: 169 lb 14.4 oz (77.1 kg).   Weight management plan: Discussed healthy diet and exercise guidelines and patient will follow up in 6 months in clinic to re-evaluate.    Counseling Resources:  ATP IV Guidelines  Pooled Cohorts Equation Calculator  FRAX Risk Assessment  ICSI Preventive Guidelines  Dietary Guidelines for Americans, 2010  USDA's MyPlate  ASA Prophylaxis  Lung CA Screening    Stanley Leroy MD  [facility]  "

## 2018-06-19 LAB
HCV AB SERPL QL IA: NONREACTIVE
HIV 1+2 AB+HIV1 P24 AG SERPL QL IA: NONREACTIVE

## 2018-06-19 ASSESSMENT — ASTHMA QUESTIONNAIRES: ACT_TOTALSCORE: 20

## 2018-06-20 LAB — TESTOST SERPL-MCNC: 350 NG/DL (ref 240–950)

## 2018-07-06 DIAGNOSIS — I10 ESSENTIAL HYPERTENSION WITH GOAL BLOOD PRESSURE LESS THAN 140/90: ICD-10-CM

## 2018-07-06 RX ORDER — LOSARTAN POTASSIUM 100 MG/1
TABLET ORAL
Qty: 90 TABLET | Refills: 3 | Status: SHIPPED | OUTPATIENT
Start: 2018-07-06 | End: 2019-06-19

## 2018-07-06 NOTE — TELEPHONE ENCOUNTER
Chart shows recently approved    Last Written Prescription Date:  6/18/18  Last Fill Quantity: 90,  # refills: 3   Last office visit: 6/18/2018 with prescribing provider:  [facility]   Future Office Visit:

## 2018-08-16 ENCOUNTER — TRANSFERRED RECORDS (OUTPATIENT)
Dept: HEALTH INFORMATION MANAGEMENT | Facility: CLINIC | Age: 53
End: 2018-08-16

## 2018-08-29 DIAGNOSIS — I10 ESSENTIAL HYPERTENSION WITH GOAL BLOOD PRESSURE LESS THAN 140/90: ICD-10-CM

## 2018-08-29 RX ORDER — HYDROCHLOROTHIAZIDE 25 MG/1
25 TABLET ORAL DAILY
Qty: 90 TABLET | Refills: 2 | Status: SHIPPED | OUTPATIENT
Start: 2018-08-29 | End: 2019-05-27

## 2018-08-29 NOTE — TELEPHONE ENCOUNTER
"HYDROCHLOROTHIAZIDE TAB 25MG        Last Written Prescription Date:  6/18/18  Last Fill Quantity: 90,   # refills: 3  Last Office Visit: 6/18/18  Future Office visit:       Requested Prescriptions   Pending Prescriptions Disp Refills     hydrochlorothiazide (HYDRODIURIL) 25 MG tablet [Pharmacy Med Name: HYDROCHLOROTHIAZIDE TAB 25MG] 90 tablet 0     Sig: TAKE 1 TABLET DAILY (OKAY TO FILL TODAY. NEEDS FASTING LAB AND MD APPOINTMENT BEFORE FURTHER REFILLS AS IT HAS BEEN ONE YEAR)    Diuretics (Including Combos) Protocol Failed    8/29/2018 12:56 AM       Failed - Normal serum creatinine on file in past 12 months    Recent Labs   Lab Test  06/20/16   1044   CR  0.64*             Failed - Normal serum potassium on file in past 12 months    Recent Labs   Lab Test  06/20/16   1044   POTASSIUM  4.0                   Failed - Normal serum sodium on file in past 12 months    Recent Labs   Lab Test  06/20/16   1044   NA  139             Passed - Blood pressure under 140/90 in past 12 months    BP Readings from Last 3 Encounters:   06/18/18 110/73   06/19/17 144/88   02/13/17 (!) 144/100                Passed - Recent (12 mo) or future (30 days) visit within the authorizing provider's specialty    Patient had office visit in the last 12 months or has a visit in the next 30 days with authorizing provider or within the authorizing provider's specialty.  See \"Patient Info\" tab in inbasket, or \"Choose Columns\" in Meds & Orders section of the refill encounter.           Passed - Patient is age 18 or older          "

## 2018-08-29 NOTE — TELEPHONE ENCOUNTER
Prescription approved per [facility] [place]. Order from 6/18/18 office visit was to local pharmacy for 90 with 3 refills so now 90 ordered with 2 refills to mail order.   Cecil Burgess RN

## 2019-02-10 DIAGNOSIS — I10 BENIGN ESSENTIAL HYPERTENSION: ICD-10-CM

## 2019-02-11 RX ORDER — AMLODIPINE BESYLATE 2.5 MG/1
TABLET ORAL
Qty: 90 TABLET | Refills: 1 | Status: SHIPPED | OUTPATIENT
Start: 2019-02-11 | End: 2019-06-19

## 2019-02-11 NOTE — TELEPHONE ENCOUNTER
Routing refill request to provider for review/approval because:  Labs out of range:  See below    Nona Pina RN

## 2019-02-11 NOTE — TELEPHONE ENCOUNTER
"AMLODIPINE BESYLATE TABS 2.5MG      Last Written Prescription Date:  8/14/18  Last Fill Quantity: 90,   # refills: 1  Last Office Visit: 6/18/18  Future Office visit:       Requested Prescriptions   Pending Prescriptions Disp Refills     amLODIPine (NORVASC) 2.5 MG tablet [Pharmacy Med Name: AMLODIPINE BESYLATE TABS 2.5MG] 90 tablet 1     Sig: TAKE 1 TABLET DAILY    Calcium Channel Blockers Protocol  Failed - 2/10/2019  6:00 AM       Failed - Normal serum creatinine on file in past 12 months    Recent Labs   Lab Test 06/20/16  1044   CR 0.64*            Passed - Blood pressure under 140/90 in past 12 months    BP Readings from Last 3 Encounters:   06/18/18 110/73   06/19/17 144/88   02/13/17 (!) 144/100                Passed - Recent (12 mo) or future (30 days) visit within the authorizing provider's specialty    Patient had office visit in the last 12 months or has a visit in the next 30 days with authorizing provider or within the authorizing provider's specialty.  See \"Patient Info\" tab in inbasket, or \"Choose Columns\" in Meds & Orders section of the refill encounter.             Passed - Medication is active on med list       Passed - Patient is age 18 or older          "

## 2019-05-27 DIAGNOSIS — I10 ESSENTIAL HYPERTENSION WITH GOAL BLOOD PRESSURE LESS THAN 140/90: ICD-10-CM

## 2019-05-28 RX ORDER — HYDROCHLOROTHIAZIDE 25 MG/1
TABLET ORAL
Qty: 90 TABLET | Refills: 0 | Status: SHIPPED | OUTPATIENT
Start: 2019-05-28 | End: 2019-06-19

## 2019-05-28 NOTE — TELEPHONE ENCOUNTER
Medication is being filled for 1 time refill only due to:  Patient needs labs bmp. Future labs ordered yes.   Cecil Burgess RN

## 2019-05-28 NOTE — TELEPHONE ENCOUNTER
"HYDROCHLOROTHIAZIDE TAB 25MG      Last Written Prescription Date:  8/29/18  Last Fill Quantity: 90,   # refills: 2  Last Office Visit: 6/18/18  Future Office visit:    Next 5 appointments (look out 90 days)    Jun 19, 2019  8:00 AM CDT  PHYSICAL with Stanley Leroy MD  [facility] [address] [facility] [id number] [phone]           Requested Prescriptions   Pending Prescriptions Disp Refills     hydrochlorothiazide (HYDRODIURIL) 25 MG tablet [Pharmacy Med Name: HYDROCHLOROTHIAZIDE TAB 25MG] 90 tablet 2     Sig: TAKE 1 TABLET DAILY       Diuretics (Including Combos) Protocol Failed - 5/27/2019 12:01 AM        Failed - Normal serum creatinine on file in past 12 months     Recent Labs   Lab Test 06/20/16  1044   CR 0.64*              Failed - Normal serum potassium on file in past 12 months     Recent Labs   Lab Test 06/20/16  1044   POTASSIUM 4.0                    Failed - Normal serum sodium on file in past 12 months     Recent Labs   Lab Test 06/20/16  1044                 Passed - Blood pressure under 140/90 in past 12 months     BP Readings from Last 3 Encounters:   06/18/18 110/73   06/19/17 144/88   02/13/17 (!) 144/100                 Passed - Recent (12 mo) or future (30 days) visit within the authorizing provider's specialty     Patient had office visit in the last 12 months or has a visit in the next 30 days with authorizing provider or within the authorizing provider's specialty.  See \"Patient Info\" tab in inbasket, or \"Choose Columns\" in Meds & Orders section of the refill encounter.              Passed - Medication is active on med list        Passed - Patient is age 18 or older          "

## 2019-06-19 ENCOUNTER — OFFICE VISIT (OUTPATIENT)
Dept: FAMILY MEDICINE | Facility: CLINIC | Age: 54
End: 2019-06-19
Payer: COMMERCIAL

## 2019-06-19 VITALS
HEIGHT: 67 IN | SYSTOLIC BLOOD PRESSURE: 137 MMHG | WEIGHT: 169.5 LBS | HEART RATE: 79 BPM | BODY MASS INDEX: 26.6 KG/M2 | RESPIRATION RATE: 14 BRPM | TEMPERATURE: 97.7 F | DIASTOLIC BLOOD PRESSURE: 86 MMHG

## 2019-06-19 DIAGNOSIS — Z00.01 ENCOUNTER FOR ROUTINE ADULT MEDICAL EXAM WITH ABNORMAL FINDINGS: ICD-10-CM

## 2019-06-19 DIAGNOSIS — I10 ESSENTIAL HYPERTENSION WITH GOAL BLOOD PRESSURE LESS THAN 140/90: ICD-10-CM

## 2019-06-19 DIAGNOSIS — Z12.5 SCREENING FOR PROSTATE CANCER: ICD-10-CM

## 2019-06-19 DIAGNOSIS — Z00.00 ROUTINE GENERAL MEDICAL EXAMINATION AT A HEALTH CARE FACILITY: ICD-10-CM

## 2019-06-19 DIAGNOSIS — N52.02 CORPORO-VENOUS OCCLUSIVE ERECTILE DYSFUNCTION: ICD-10-CM

## 2019-06-19 DIAGNOSIS — E78.5 HYPERLIPIDEMIA WITH TARGET LDL LESS THAN 130: ICD-10-CM

## 2019-06-19 DIAGNOSIS — I10 ESSENTIAL HYPERTENSION: ICD-10-CM

## 2019-06-19 DIAGNOSIS — J45.20 MILD INTERMITTENT ASTHMA WITHOUT COMPLICATION: Primary | ICD-10-CM

## 2019-06-19 LAB
ANION GAP SERPL CALCULATED.3IONS-SCNC: 4 MMOL/L (ref 3–14)
BUN SERPL-MCNC: 21 MG/DL (ref 7–30)
CALCIUM SERPL-MCNC: 10 MG/DL (ref 8.5–10.1)
CHLORIDE SERPL-SCNC: 103 MMOL/L (ref 94–109)
CHOLEST SERPL-MCNC: 217 MG/DL
CO2 SERPL-SCNC: 29 MMOL/L (ref 20–32)
CREAT SERPL-MCNC: 0.62 MG/DL (ref 0.66–1.25)
GFR SERPL CREATININE-BSD FRML MDRD: >90 ML/MIN/{1.73_M2}
GLUCOSE SERPL-MCNC: 101 MG/DL (ref 70–99)
HDLC SERPL-MCNC: 43 MG/DL
LDLC SERPL CALC-MCNC: 126 MG/DL
NONHDLC SERPL-MCNC: 174 MG/DL
POTASSIUM SERPL-SCNC: 3.4 MMOL/L (ref 3.4–5.3)
PSA SERPL-ACNC: 0.38 UG/L (ref 0–4)
SODIUM SERPL-SCNC: 136 MMOL/L (ref 133–144)
TRIGL SERPL-MCNC: 242 MG/DL

## 2019-06-19 PROCEDURE — 36415 COLL VENOUS BLD VENIPUNCTURE: CPT | Performed by: FAMILY MEDICINE

## 2019-06-19 PROCEDURE — 80048 BASIC METABOLIC PNL TOTAL CA: CPT | Performed by: FAMILY MEDICINE

## 2019-06-19 PROCEDURE — 99396 PREV VISIT EST AGE 40-64: CPT | Performed by: FAMILY MEDICINE

## 2019-06-19 PROCEDURE — 80061 LIPID PANEL: CPT | Performed by: FAMILY MEDICINE

## 2019-06-19 PROCEDURE — G0103 PSA SCREENING: HCPCS | Performed by: FAMILY MEDICINE

## 2019-06-19 RX ORDER — AMLODIPINE BESYLATE 2.5 MG/1
2.5 TABLET ORAL DAILY
Qty: 90 TABLET | Refills: 3 | Status: SHIPPED | OUTPATIENT
Start: 2019-06-19 | End: 2019-08-16

## 2019-06-19 RX ORDER — ROSUVASTATIN CALCIUM 10 MG/1
5 TABLET, COATED ORAL DAILY
Qty: 45 TABLET | Refills: 3 | Status: SHIPPED | OUTPATIENT
Start: 2019-06-19 | End: 2020-11-22

## 2019-06-19 RX ORDER — HYDROCHLOROTHIAZIDE 25 MG/1
25 TABLET ORAL DAILY
Qty: 90 TABLET | Refills: 3 | Status: SHIPPED | OUTPATIENT
Start: 2019-06-19 | End: 2020-07-06

## 2019-06-19 RX ORDER — SILDENAFIL CITRATE 20 MG/1
TABLET ORAL
Qty: 90 TABLET | Refills: 3 | Status: SHIPPED | OUTPATIENT
Start: 2019-06-19 | End: 2021-10-28

## 2019-06-19 RX ORDER — LOSARTAN POTASSIUM 100 MG/1
100 TABLET ORAL DAILY
Qty: 90 TABLET | Refills: 3 | Status: SHIPPED | OUTPATIENT
Start: 2019-06-19 | End: 2019-07-01

## 2019-06-19 ASSESSMENT — PAIN SCALES - GENERAL: PAINLEVEL: SEVERE PAIN (6)

## 2019-06-19 ASSESSMENT — MIFFLIN-ST. JEOR: SCORE: 1558.51

## 2019-06-19 NOTE — PROGRESS NOTES
SUBJECTIVE:   CC: Anthony De La Torre is an 53 year old male who presents for preventive health visit.     Healthy Habits:    Do you get at least three servings of calcium containing foods daily (dairy, green leafy vegetables, etc.)? yes    Amount of exercise or daily activities, outside of work: 2-3 day(s) per week    Problems taking medications regularly No    Medication side effects: No    Have you had an eye exam in the past two years? yes    Do you see a dentist twice per year? yes    Do you have sleep apnea, excessive snoring or daytime drowsiness?yes    Wt Readings from Last 10 Encounters:   06/19/19 76.9 kg (169 lb 8 oz)   06/18/18 77.1 kg (169 lb 14.4 oz)   06/19/17 76.3 kg (168 lb 4.8 oz)   02/13/17 75.3 kg (166 lb)   06/20/16 77.2 kg (170 lb 1.6 oz)   06/17/15 76 kg (167 lb 9.6 oz)   07/03/14 76.8 kg (169 lb 6.4 oz)   01/15/14 77.1 kg (170 lb)   01/13/14 77.2 kg (170 lb 3.2 oz)   01/15/13 77.6 kg (171 lb)     Patient informed that anything we discuss that is not related to preventative medicine, may be billed for; patient verbalizes understanding.    **He wants to discuss let knee and foot pain.    Hyperlipidemia Follow-Up      Are you having any of the following symptoms? (Select all that apply)  No complaints of shortness of breath, chest pain or pressure.  No increased sweating or nausea with activity.  No left-sided neck or arm pain.  No complaints of pain in calves when walking 1-2 blocks.    Are you regularly taking any medication or supplement to lower your cholesterol?   Yes- rosuvastatin    Are you having muscle aches or other side effects that you think could be caused by your cholesterol lowering medication?  Yes- he takes it every other wook      Hypertension Follow-up      Do you check your blood pressure regularly outside of the clinic? Yes     Are you following a low salt diet? Yes    Are your blood pressures ever more than 140 on the top number (systolic) OR more   than 90 on the bottom  number (diastolic), for example 140/90? No    Today's PHQ-2 Score:   PHQ-2 ( 1999 Pf[employer]) 6/19/2019 6/18/2018   Q1: Little interest or pleasure in doing things 0 0   Q2: Feeling down, depressed or hopeless 0 1   PHQ-2 Score 0 1     Abuse: Current or Past(Physical, Sexual or Emotional)- No  Do you feel safe in your environment? Yes    Social History     Tobacco Use     Smoking status: Current Every Day Smoker     Packs/day: 1.00     Years: 20.00     Pack years: 20.00     Types: Cigarettes     Smokeless tobacco: Never Used     Tobacco comment: 1.00 ppd or less   Substance Use Topics     Alcohol use: Yes     Comment: Occasional - 6-7 drinks per week     If you drink alcohol do you typically have >3 drinks per day or >7 drinks per week? No                      Last PSA:   PSA   Date Value Ref Range Status   01/13/2014 0.44 0 - 4 ug/L Final       Reviewed orders with patient. Reviewed health maintenance and updated orders accordingly - Yes  BP Readings from Last 3 Encounters:   06/19/19 (!) 137/92   06/18/18 110/73   06/19/17 144/88    Wt Readings from Last 3 Encounters:   06/19/19 76.9 kg (169 lb 8 oz)   06/18/18 77.1 kg (169 lb 14.4 oz)   06/19/17 76.3 kg (168 lb 4.8 oz)                  Patient Active Problem List   Diagnosis     Plantar fascial fibromatosis     Achilles tendonitis     Fatigue     HTN (hypertension)     Intermittent asthma     Sensorineural hearing loss, asymmetrical     Hyperlipidemia with target LDL less than 130     Tinnitus of both ears     Cervicalgia     Mild intermittent asthma without complication     Essential hypertension with goal blood pressure less than 140/90     Colon polyps-tubular adenoma     Past Surgical History:   Procedure Laterality Date     HERNIA REPAIR      Right     PE TUBES  4/05/2010    BMT     SURGICAL HISTORY OF -       Right ankle     VASECTOMY  [id number]    Dr Concepcion       Social History     Tobacco Use     Smoking status: Current Every Day Smoker     Packs/day: 1.00      Years: 20.00     Pack years: 20.00     Types: Cigarettes     Smokeless tobacco: Never Used     Tobacco comment: 1.00 ppd or less   Substance Use Topics     Alcohol use: Yes     Comment: Occasional - 6-7 drinks per week     Family History   Problem Relation Age of Onset     Lipids Father      Hypertension Father      Musculoskeletal Disorder Father         Epilepsy     Diabetes Paternal Grandmother      Cancer - colorectal Paternal Grandfather      C.A.D. No family hx of      Cerebrovascular Disease No family hx of      Breast Cancer No family hx of      Prostate Cancer No family hx of          Current Outpatient Medications   Medication Sig Dispense Refill     albuterol (PROAIR HFA/PROVENTIL HFA/VENTOLIN HFA) 108 (90 BASE) MCG/ACT Inhaler Inhale 2 puffs into the lungs every 6 hours as needed for shortness of breath / dyspnea 1 Inhaler 6     amLODIPine (NORVASC) 2.5 MG tablet Take 1 tablet (2.5 mg) by mouth daily 90 tablet 3     ascorbic acid (VITAMIN C) 1000 MG TABS Take 1,000 mg by mouth daily.       aspirin 81 MG chewable tablet Take 81 mg by mouth daily.       Bioflavonoid Products (BIOFLEX PO) Take 1-2 tablets by mouth daily.       cetirizine HCl 10 MG CAPS Take 25 mg by mouth daily as needed       fish oil-omega-3 fatty acids (FISH OIL) 1000 MG capsule Take 2 g by mouth daily.       fluticasone (FLONASE) 50 MCG/ACT nasal spray        hydrochlorothiazide (HYDRODIURIL) 25 MG tablet Take 1 tablet (25 mg) by mouth daily 90 tablet 3     losartan (COZAAR) 100 MG tablet Take 1 tablet (100 mg) by mouth daily 90 tablet 3     rosuvastatin (CRESTOR) 10 MG tablet Take 0.5 tablets (5 mg) by mouth daily 45 tablet 3     sildenafil (REVATIO) 20 MG tablet 2-4 tabs prior to sex 90 tablet 3     Allergies   Allergen Reactions     Nkda [No Known Drug Allergies]      Seasonal Allergies        Reviewed and updated as needed this visit by clinical staff  Tobacco  Allergies  Meds         Reviewed and updated as needed this visit  "by Provider       ROS:  CONSTITUTIONAL: NEGATIVE for fever, chills, change in weight  INTEGUMENTARY/SKIN: NEGATIVE for worrisome rashes, moles or lesions  EYES: NEGATIVE for vision changes or irritation  ENT: NEGATIVE for ear, mouth and throat problems  RESP: NEGATIVE for significant cough or SOB  CV: NEGATIVE for chest pain, palpitations or peripheral edema  GI: NEGATIVE for nausea, abdominal pain, heartburn, or change in bowel habits   male: negative for dysuria, hematuria, decreased urinary stream, erectile dysfunction, urethral discharge  MUSCULOSKELETAL: NEGATIVE for significant arthralgias or myalgia  NEURO: NEGATIVE for weakness, dizziness or paresthesias  PSYCHIATRIC: NEGATIVE for changes in mood or affect    OBJECTIVE:   BP (!) 137/92 (BP Location: Right arm, Patient Position: Sitting, Cuff Size: Adult Regular)   Pulse 79   Temp 97.7  F (36.5  C) (Tympanic)   Resp 14   Ht 1.695 m (5' 6.75\")   Wt 76.9 kg (169 lb 8 oz)   BMI 26.75 kg/m       EXAM:  GENERAL: healthy, alert and no distress  NECK: no adenopathy, no asymmetry, masses, or scars and thyroid normal to palpation  RESP: lungs clear to auscultation - no rales, rhonchi or wheezes  CV: regular rate and rhythm, normal S1 S2, no S3 or S4, no murmur, click or rub, no peripheral edema and peripheral pulses strong  ABDOMEN: soft, nontender, no hepatosplenomegaly, no masses and bowel sounds normal  MS: no gross musculoskeletal defects noted, no edema    ASSESSMENT/PLAN:   1. Routine general medical examination at a health care facility    2. Encounter for routine adult medical exam with abnormal findings      COUNSELING:  Reviewed preventive health counseling, as reflected in patient instructions       Regular exercise       Healthy diet/nutrition       Vision screening       Hearing screening       Colon cancer screening       Prostate cancer screening    Estimated body mass index is 26.75 kg/m  as calculated from the following:    Height as of this " "encounter: 1.695 m (5' 6.75\").    Weight as of this encounter: 76.9 kg (169 lb 8 oz).    Weight management plan: Discussed healthy diet and exercise guidelines     reports that he has been smoking cigarettes.  He has a 20.00 pack-year smoking history. He has never used smokeless tobacco.    Counseling Resources:  ATP IV Guidelines  Pooled Cohorts Equation Calculator  FRAX Risk Assessment  ICSI Preventive Guidelines  Dietary Guidelines for Americans, 2010  USDA's MyPlate  ASA Prophylaxis  Lung CA Screening    Stanley Leroy MD  [facility]  "

## 2019-07-01 DIAGNOSIS — I10 ESSENTIAL HYPERTENSION WITH GOAL BLOOD PRESSURE LESS THAN 140/90: ICD-10-CM

## 2019-07-01 NOTE — TELEPHONE ENCOUNTER
"LOSARTAN TABS 100MG      Last Written Prescription Date:  6/19/19  Last Fill Quantity: 90,   # refills: 3  Last Office Visit: 6/19/19  Future Office visit:       Requested Prescriptions   Pending Prescriptions Disp Refills     losartan (COZAAR) 100 MG tablet [Pharmacy Med Name: LOSARTAN TABS 100MG] 90 tablet 3     Sig: TAKE 1 TABLET DAILY       Angiotensin-II Receptors Failed - 7/1/2019 12:03 AM        Failed - Normal serum creatinine on file in past 12 months     Recent Labs   Lab Test 06/19/19  0901   CR 0.62*             Passed - Blood pressure under 140/90 in past 12 months     BP Readings from Last 3 Encounters:   06/19/19 137/86   06/18/18 110/73   06/19/17 144/88                 Passed - Recent (12 mo) or future (30 days) visit within the authorizing provider's specialty     Patient had office visit in the last 12 months or has a visit in the next 30 days with authorizing provider or within the authorizing provider's specialty.  See \"Patient Info\" tab in inbasket, or \"Choose Columns\" in Meds & Orders section of the refill encounter.              Passed - Medication is active on med list        Passed - Patient is age 18 or older        Passed - Normal serum potassium on file in past 12 months     Recent Labs   Lab Test 06/19/19  0901   POTASSIUM 3.4                      "

## 2019-07-02 RX ORDER — LOSARTAN POTASSIUM 100 MG/1
TABLET ORAL
Qty: 90 TABLET | Refills: 3 | Status: SHIPPED | OUTPATIENT
Start: 2019-07-02 | End: 2020-09-09

## 2019-08-06 ENCOUNTER — OFFICE VISIT (OUTPATIENT)
Dept: FAMILY MEDICINE | Facility: CLINIC | Age: 54
End: 2019-08-06
Payer: COMMERCIAL

## 2019-08-06 VITALS
DIASTOLIC BLOOD PRESSURE: 91 MMHG | SYSTOLIC BLOOD PRESSURE: 136 MMHG | WEIGHT: 170.3 LBS | TEMPERATURE: 98.3 F | RESPIRATION RATE: 14 BRPM | HEIGHT: 67 IN | OXYGEN SATURATION: 98 % | HEART RATE: 83 BPM | BODY MASS INDEX: 26.73 KG/M2

## 2019-08-06 DIAGNOSIS — M46.1 SACROILIITIS (H): ICD-10-CM

## 2019-08-06 DIAGNOSIS — S39.012A LUMBAR STRAIN, INITIAL ENCOUNTER: Primary | ICD-10-CM

## 2019-08-06 PROCEDURE — 99213 OFFICE O/P EST LOW 20 MIN: CPT | Performed by: FAMILY MEDICINE

## 2019-08-06 RX ORDER — CYCLOBENZAPRINE HCL 10 MG
10 TABLET ORAL 3 TIMES DAILY PRN
Qty: 20 TABLET | Refills: 0 | Status: SHIPPED | OUTPATIENT
Start: 2019-08-06 | End: 2022-04-13

## 2019-08-06 ASSESSMENT — MIFFLIN-ST. JEOR: SCORE: 1562.14

## 2019-08-06 NOTE — LETTER
My Asthma Action Plan  Name: Anthony De La Torre   YOB: 1965  Date: 8/6/2019   My doctor: Chandrika Chavez MD   My clinic: [facility]        My Control Medicine: None  My Rescue Medicine: Albuterol   My Asthma Severity: intermittent  Avoid your asthma triggers:                GREEN ZONE   Good Control    I feel good    No cough or wheeze    Can work, sleep and play without asthma symptoms       Take your asthma control medicine every day.     1. If exercise triggers your asthma, take your rescue medication    15 minutes before exercise or sports, and    During exercise if you have asthma symptoms  2. Spacer to use with inhaler: If you have a spacer, make sure to use it with your inhaler             YELLOW ZONE Getting Worse  I have ANY of these:    I do not feel good    Cough or wheeze    Chest feels tight    Wake up at night   1. Keep taking your Green Zone medications  2. Start taking your rescue medicine:    every 20 minutes for up to 1 hour. Then every 4 hours for 24-48 hours.  3. If you stay in the Yellow Zone for more than 12-24 hours, contact your doctor.  4. If you do not return to the Green Zone in 12-24 hours or you get worse, start taking your oral steroid medicine if prescribed by your provider.           RED ZONE Medical Alert - Get Help  I have ANY of these:    I feel awful    Medicine is not helping    Breathing getting harder    Trouble walking or talking    Nose opens wide to breathe       1. Take your rescue medicine NOW  2. If your provider has prescribed an oral steroid medicine, start taking it NOW  3. Call your doctor NOW  4. If you are still in the Red Zone after 20 minutes and you have not reached your doctor:    Take your rescue medicine again and    Call 911 or go to the emergency room right away    See your regular doctor within 2 weeks of an Emergency Room or Urgent Care visit for follow-up treatment.          Annual Reminders:  Meet with Asthma Educator,  Flu  Shot in the Fall, consider Pneumonia Vaccination for patients with asthma (aged 19 and older).    Pharmacy:    [facility]  [place], [place] - [address]  [facility] - [address]  [facility] - [address]                      Asthma Triggers  How To Control Things That Make Your Asthma Worse    Triggers are things that make your asthma worse.  Look at the list below to help you find your triggers and what you can do about them.  You can help prevent asthma flare-ups by staying away from your triggers.      Trigger                                                          What you can do   Cigarette Smoke  Tobacco smoke can make asthma worse. Do not allow smoking in your home, car or around you.  Be sure no one smokes at a child s day care or school.  If you smoke, ask your health care provider for ways to help you quit.  Ask family members to quit too.  Ask your health care provider for a referral to Quit Plan to help you quit smoking, or call 6-773-547-PLAN.     Colds, Flu, Bronchitis  These are common triggers of asthma. Wash your hands often.  Don t touch your eyes, nose or mouth.  Get a flu shot every year.     Dust Mites  These are tiny bugs that live in cloth or carpet. They are too small to see. Wash sheets and blankets in hot water every week.   Encase pillows and mattress in dust mite proof covers.  Avoid having carpet if you can. If you have carpet, vacuum weekly.   Use a dust mask and HEPA vacuum.   Pollen and Outdoor Mold  Some people are allergic to trees, grass, or weed pollen, or molds. Try to keep your windows closed.  Limit time out doors when pollen count is high.   Ask you health care provider about taking medicine during allergy season.     Animal Dander  Some people are allergic to skin flakes, urine or saliva from pets with fur or feathers. Keep pets with fur or feathers out of your  home.    If you can t keep the pet outdoors, then keep the pet out of your bedroom.  Keep the bedroom door closed.  Keep pets off cloth furniture and away from stuffed toys.     Mice, Rats, and Cockroaches  Some people are allergic to the waste from these pests.   Cover food and garbage.  Clean up spills and food crumbs.  Store grease in the refrigerator.   Keep food out of the bedroom.   Indoor Mold  This can be a trigger if your home has high moisture. Fix leaking faucets, pipes, or other sources of water.   Clean moldy surfaces.  Dehumidify basement if it is damp and smelly.   Smoke, Strong Odors, and Sprays  These can reduce air quality. Stay away from strong odors and sprays, such as perfume, powder, hair spray, paints, smoke incense, paint, cleaning products, candles and new carpet.   Exercise or Sports  Some people with asthma have this trigger. Be active!  Ask your doctor about taking medicine before sports or exercise to prevent symptoms.    Warm up for 5-10 minutes before and after sports or exercise.     Other Triggers of Asthma  Cold air:  Cover your nose and mouth with a scarf.  Sometimes laughing or crying can be a trigger.  Some medicines and food can trigger asthma.

## 2019-08-06 NOTE — LETTER
[facility]  [address] [address]  Phone: [phone]      August 6, 2019      RE: Anthony De La Torre  [address] [address]        To whom it may concern:    Anthony MEGAN De La Torre is under my professional care. The employee is UNABLE to return to work until 8/14/19.         Sincerely,        Dr. Chandrika Chavez

## 2019-08-06 NOTE — PROGRESS NOTES
SUBJECTIVE:                                                    Anthony De La Torre is a 53 year old male who presents to clinic today for the following health issues:    Back Pain       Duration: Last Sunday night         Specific cause: none. Did yard work and hanging blinds. Back went out last fall.     Description:   Location of pain: low back right  Character of pain: sharp- was now a dull ache   Pain radiation:none  New numbness or weakness in legs, not attributed to pain:  no     Intensity: mild    History:   Pain interferes with job: YES  History of back problems: yes   Any previous MRI or X-rays: X-rays   Sees a specialist for back pain:  No  Therapies tried without relief: Ibuprofen     Alleviating factors:   Improved by: chiropractor, hot, cold, Biofreeze     Precipitating factors:  Worsened by: Standing    9 days ago   A day of yardwork and hanging a blind made the back flare up     Right lower back pain  Into right buttocks  Worst with standing up   No weakness or numbness in the right leg     Went to chiropractor and had xrays - back usually gets better after he is adjusted but didn't     Ibuprofen doesn't help  Hot tub and icing     No PT     Getting better overall just not as fast as usual     Review of systems:  No f/c   No saddle anesthesia   No tingling/numbness  No weakness       Problem list and histories reviewed & adjusted, as indicated.  Additional history: as documented     Patient Active Problem List   Diagnosis     Plantar fascial fibromatosis     Achilles tendonitis     Fatigue     HTN (hypertension)     Intermittent asthma     Sensorineural hearing loss, asymmetrical     Hyperlipidemia with target LDL less than 130     Tinnitus of both ears     Cervicalgia     Mild intermittent asthma without complication     Essential hypertension with goal blood pressure less than 140/90     Colon polyps-tubular adenoma     Current Outpatient Medications   Medication     amLODIPine (NORVASC) 2.5 MG tablet  "    ascorbic acid (VITAMIN C) 1000 MG TABS     aspirin 81 MG chewable tablet     Bioflavonoid Products (BIOFLEX PO)     cetirizine HCl 10 MG CAPS     fish oil-omega-3 fatty acids (FISH OIL) 1000 MG capsule     fluticasone (FLONASE) 50 MCG/ACT nasal spray     hydrochlorothiazide (HYDRODIURIL) 25 MG tablet     losartan (COZAAR) 100 MG tablet     rosuvastatin (CRESTOR) 10 MG tablet     sildenafil (REVATIO) 20 MG tablet     albuterol (PROAIR HFA/PROVENTIL HFA/VENTOLIN HFA) 108 (90 BASE) MCG/ACT Inhaler     No current facility-administered medications for this visit.         Allergies   Allergen Reactions     Nkda [No Known Drug Allergies]      Seasonal Allergies          OBJECTIVE:                                                    BP (!) 136/91 (BP Location: Right arm, Patient Position: Sitting, Cuff Size: Adult Large)   Pulse 83   Temp 98.3  F (36.8  C) (Tympanic)   Resp 14   Ht 1.695 m (5' 6.75\")   Wt 77.2 kg (170 lb 4.8 oz)   SpO2 98%   BMI 26.87 kg/m   Body mass index is 26.87 kg/m .   GENERAL - Pt is alert and oriented in no acute distress.  Affect is appropriate. Good eye contact.  BACK  - reflexes 2+ bilaterally. Sensation intact. LE Strength 5/5. Normal heel and toe walking  Back shows full extension, flexion, and lateral bending. Pain over right lower paraspinous muscles. Tender over right SI joint. No central tenderness to palpation.  Negative straight leg raising test. Hips show full ROM         ASSESSMENT/PLAN:                                                      (S37.117Y) Lumbar strain, initial encounter  (primary encounter diagnosis)  Comment: discussed that diagnosis is likely be lumbar strain and/or SI joint dysfunction. I think he needs PT and muscle strengthening/balancing work.  Flexeril given for prn usage. Return to clinic if symptoms persist/change/worsen. The patient indicates understanding of these issues and agrees with the plan.   Plan: [facility], AND CHIROPRACTIC REFERRAL,         " cyclobenzaprine (FLEXERIL) 10 MG tablet            (M46.1) Sacroiliitis (H)  Comment: see above   Plan: [facility], AND CHIROPRACTIC REFERRAL,         cyclobenzaprine (FLEXERIL) 10 MG tablet          MARY Chavez MD     [facility]

## 2019-08-06 NOTE — PATIENT INSTRUCTIONS
Patient Education     Understanding Sacroiliac Strain    A joint is a place where 2 bones meet. The 2 sacroiliac joints are where the hip (iliac) bones meet the bottom part of the spine (sacrum). These joints are surrounded by muscle, connective tissue, and nerves. Normally, a sacroiliac joint (SIJ) does not move very much. But it can be pushed out of alignment. The tissues around an SIJ also can be stretched or torn. This can lead to pain in the low back.     How to say it  [id number] strain   Causes of sacroiliac strain  Causes of SIJ strain can include:    Stress on the SIJ from lifting weight incorrectly    Poor body mechanics and posture during sports or work activities    Damage from degenerative diseases such as arthritis    Increased pressure on the SIJ from pregnancy  Symptoms of sacroiliac strain  Symptoms of SIJ strain may include:    Aching in the low back, buttocks, or upper leg    Pain that gets worse with movement or standing for a long time, and gets better with rest    Inability to move as freely as usual    Muscle spasms in the low back  Treating sacroiliac strain  Treatment focuses on reducing pain and avoiding further injury. Treatments may include:    Prescription or over-the-counter pain medicines. These help reduce pain and swelling.    Cold packs or heat packs. These help reduce pain and swelling.    Stretching and other exercises. These improve flexibility and strength.    Physical therapy. This may include exercises or other treatments.    An SIJ belt. This medical device is worn around the hips, to make the SIJ more stable and reduce pain.    Injections of medicine. This may relieve symptoms.  Possible complications of sacroiliac strain  If the cause of the pain is not addressed, symptoms may return or get worse. Follow your healthcare provider s instructions on lifestyle changes and treating your SIJ strain.     When to call your healthcare provider  Call your healthcare provider  right away if you have any of these:    Fever of 100.4 F (38 C) or higher, or as directed    Redness or swelling    Pain that gets worse    Symptoms that don t get better with prescribed medicines, or get worse    New symptoms   Date Last Reviewed: 3/10/2016    6688-8333 [employer]. [address] [place] [address]. All rights reserved. This information is not intended as a substitute for professional medical care. Always follow your healthcare professional's instructions.

## 2019-08-07 ENCOUNTER — THERAPY VISIT (OUTPATIENT)
Dept: PHYSICAL THERAPY | Facility: CLINIC | Age: 54
End: 2019-08-07
Attending: FAMILY MEDICINE
Payer: COMMERCIAL

## 2019-08-07 DIAGNOSIS — M46.1 SACROILIITIS (H): ICD-10-CM

## 2019-08-07 DIAGNOSIS — S39.012A LUMBAR STRAIN, INITIAL ENCOUNTER: Primary | ICD-10-CM

## 2019-08-07 DIAGNOSIS — S39.012A LUMBAR STRAIN, INITIAL ENCOUNTER: ICD-10-CM

## 2019-08-07 PROCEDURE — 97140 MANUAL THERAPY 1/> REGIONS: CPT | Mod: GP | Performed by: PHYSICAL THERAPIST

## 2019-08-07 PROCEDURE — 97162 PT EVAL MOD COMPLEX 30 MIN: CPT | Mod: GP | Performed by: PHYSICAL THERAPIST

## 2019-08-07 PROCEDURE — 97110 THERAPEUTIC EXERCISES: CPT | Mod: GP | Performed by: PHYSICAL THERAPIST

## 2019-08-07 ASSESSMENT — ASTHMA QUESTIONNAIRES: ACT_TOTALSCORE: 23

## 2019-08-07 NOTE — PROGRESS NOTES
[facility] Initial Evaluation  Subjective:  The history is provided by the patient.   Anthony De La Torre being seen for Low back pain.   Problem began 7/28/2019. Where condition occurred: at home.Problem occurred: Doing yardwork, sore next day after  and reported as 7/10 on pain scale. General health as reported by patient is fair. Pertinent medical history includes:  Asthma, high blood pressure and smoking.    Surgeries include:  Orthopedic surgery. Other surgery history details: R ankle.   Other medications details: ibuprofen.   Primary job tasks include:  Lifting/carrying, prolonged standing, prolonged sitting and pushing/pulling.  Pain is described as aching and is constant. Pain is worse during the day. Since onset symptoms are gradually improving.  Previous treatment includes chiropractic (has gone every day since injury aside from 2 days.). There was mild (not lasting improvement) improvement following previous treatment.   Patient is [employer]. Restrictions include:  Currently not working due to present treatment.    Barriers include:  None as reported by patient.  Red flags:  None as reported by patient.  Type of problem:  Lumbar and sacroiliac    This is a new condition   Problem details: Patient states he was doing yardwork and his back flared up from hanging a blind. Pain stays in R low back area. Has had back pain the past, but it usually resolves quickly on own or with chiropractic. This time it is taking longer..   Patient reports pain:  Lumbar spine right and SI joint right. Radiates to:  Gluteals right. Associated symptoms:  Loss of motion/stiffness and loss of strength. Symptoms are exacerbated by standing (traveling in car) and relieved by ice and heat (lying down).                      Objective:  System         Lumbar/SI Evaluation  ROM:    AROM Lumbar:   Flexion:          Full, to floor with no effect on pain  Ext:                    Mod loss, centralizes pain report after 5  repetitions   Side Bend:        Left:  To knee, painful    Right:  To knee, painful  Rotation:           Left:     Right:   Side Glide:        Left:     Right:         Strength: glute med/max: 4/5 B, pain with R glute testing          Neural Tension/Mobility:    Left side:  SLR w/DF positive.   Right side:   SLR w/DF and SLR positive.  Lumbar Palpation:  Palpation (lumbar): very mild tenderness in R erector spinae and QL.          Spinal Segmental Conclusions: With pain at hypomobile segments.    Level: Hypo noted at L2, L4 and L3      SI joint/Sacrum:      Provocation:  No pain with SI provocation tests      Sacral conclusion left:  Anterior inominate                                                   General Evaluation:          Lower Extremity Flexibility:    Hip External Rotators:  Decreased flexibility              Hamstrings:  Decreased flexibility                                                                  ROS    Assessment/Plan:    Patient is a 53 year old male with lumbar complaints.    Patient has the following significant findings with corresponding treatment plan.                Diagnosis 1:  R sided LBP near SI joint, responded well initially to extension based activities, QL stretch too  Pain -  manual therapy and home program  Decreased ROM/flexibility - manual therapy, therapeutic exercise, therapeutic activity and home program  Decreased joint mobility - manual therapy, therapeutic exercise, therapeutic activity and home program  Decreased strength - therapeutic exercise, therapeutic activities and home program  Decreased function - therapeutic activities and home program  Impaired posture - neuro re-education, therapeutic activities and home program    Therapy Evaluation Codes:   1) History comprised of:   Personal factors that impact the plan of care:      None.    Comorbidity factors that impact the plan of care are:      High blood pressure and Smoking.     Medications impacting care: High  blood pressure.  2) Examination of Body Systems comprised of:   Body structures and functions that impact the plan of care:      Lumbar spine and Sacral illiac joint.   Activity limitations that impact the plan of care are:      Bending, Dressing, Lifting, Standing and Walking.  3) Clinical presentation characteristics are:   Evolving/Changing.  4) Decision-Making    Moderate complexity using standardized patient assessment instrument and/or measureable assessment of functional outcome.  Cumulative Therapy Evaluation is: Moderate complexity.    Previous and current functional limitations:  (See Goal Flow Sheet for this information)    Short term and Long term goals: (See Goal Flow Sheet for this information)     Communication ability:  Patient appears to be able to clearly communicate and understand verbal and written communication and follow directions correctly.  Treatment Explanation - The following has been discussed with the patient:   RX ordered/plan of care  Anticipated outcomes  Possible risks and side effects  This patient would benefit from PT intervention to resume normal activities.   Rehab potential is good.    Frequency:  2 X week, once daily  Duration:  for 2 weeks tapering to 1 X a week over 4 weeks  Discharge Plan:  Achieve all LTG.  Independent in home treatment program.  Reach maximal therapeutic benefit.    Please refer to the daily flowsheet for treatment today, total treatment time and time spent performing 1:1 timed codes.

## 2019-08-09 ENCOUNTER — THERAPY VISIT (OUTPATIENT)
Dept: PHYSICAL THERAPY | Facility: CLINIC | Age: 54
End: 2019-08-09
Payer: COMMERCIAL

## 2019-08-09 ENCOUNTER — TELEPHONE (OUTPATIENT)
Dept: FAMILY MEDICINE | Facility: CLINIC | Age: 54
End: 2019-08-09

## 2019-08-09 DIAGNOSIS — S39.012A LUMBAR STRAIN, INITIAL ENCOUNTER: Primary | ICD-10-CM

## 2019-08-09 DIAGNOSIS — I10 BENIGN ESSENTIAL HYPERTENSION: ICD-10-CM

## 2019-08-09 DIAGNOSIS — M46.1 SACROILIITIS (H): ICD-10-CM

## 2019-08-09 PROCEDURE — 97110 THERAPEUTIC EXERCISES: CPT | Mod: GP | Performed by: PHYSICAL THERAPIST

## 2019-08-09 PROCEDURE — 97140 MANUAL THERAPY 1/> REGIONS: CPT | Mod: GP | Performed by: PHYSICAL THERAPIST

## 2019-08-09 NOTE — TELEPHONE ENCOUNTER
Reason for Call:  Form, our goal is to have forms completed with 72 hours, however, some forms may require a visit or additional information.    Type of letter, form or note:  medical - Disability and Leave statement     Who is the form from?: Patient    Where did the form come from: Patient or family brought in       What clinic location was the form placed at?: [facility]     Where the form was placed: Dr. Chavez's Box/Folder    What number is listed as a contact on the form?: [phone] (cell) or [phone] (home)       Additional comments: When completed can be faxed to [phone].      Call taken on 8/9/2019 at 9:01 AM by Huyen Matamoros

## 2019-08-12 ENCOUNTER — MYC MEDICAL ADVICE (OUTPATIENT)
Dept: FAMILY MEDICINE | Facility: CLINIC | Age: 54
End: 2019-08-12

## 2019-08-12 RX ORDER — AMLODIPINE BESYLATE 2.5 MG/1
TABLET ORAL
Qty: 90 TABLET | Refills: 0 | Status: SHIPPED | OUTPATIENT
Start: 2019-08-12 | End: 2019-11-10

## 2019-08-12 NOTE — TELEPHONE ENCOUNTER
"AMLODIPINE BESYLATE TABS 2.5MG      Last Written Prescription Date:  6/19/19  Last Fill Quantity: 90,   # refills: 3  Last Office Visit: 8/6/19  Future Office visit:       Requested Prescriptions   Pending Prescriptions Disp Refills     amLODIPine (NORVASC) 2.5 MG tablet [Pharmacy Med Name: AMLODIPINE BESYLATE TABS 2.5MG] 90 tablet 1     Sig: TAKE 1 TABLET DAILY       Calcium Channel Blockers Protocol  Failed - 8/9/2019 11:56 PM        Failed - Blood pressure under 140/90 in past 12 months     BP Readings from Last 3 Encounters:   08/06/19 (!) 136/91   06/19/19 137/86   06/18/18 110/73                 Failed - Normal serum creatinine on file in past 12 months     Recent Labs   Lab Test 06/19/19  0901   CR 0.62*             Passed - Recent (12 mo) or future (30 days) visit within the authorizing provider's specialty     Patient had office visit in the last 12 months or has a visit in the next 30 days with authorizing provider or within the authorizing provider's specialty.  See \"Patient Info\" tab in inbasket, or \"Choose Columns\" in Meds & Orders section of the refill encounter.              Passed - Medication is active on med list        Passed - Patient is age 18 or older          "

## 2019-08-13 ENCOUNTER — THERAPY VISIT (OUTPATIENT)
Dept: PHYSICAL THERAPY | Facility: CLINIC | Age: 54
End: 2019-08-13
Payer: COMMERCIAL

## 2019-08-13 ENCOUNTER — OFFICE VISIT (OUTPATIENT)
Dept: FAMILY MEDICINE | Facility: CLINIC | Age: 54
End: 2019-08-13
Payer: COMMERCIAL

## 2019-08-13 VITALS
WEIGHT: 170 LBS | SYSTOLIC BLOOD PRESSURE: 139 MMHG | BODY MASS INDEX: 26.68 KG/M2 | HEIGHT: 67 IN | DIASTOLIC BLOOD PRESSURE: 88 MMHG | HEART RATE: 88 BPM

## 2019-08-13 DIAGNOSIS — S39.012D STRAIN OF LUMBAR REGION, SUBSEQUENT ENCOUNTER: Primary | ICD-10-CM

## 2019-08-13 DIAGNOSIS — M54.41 RIGHT-SIDED LOW BACK PAIN WITH RIGHT-SIDED SCIATICA: Primary | ICD-10-CM

## 2019-08-13 PROCEDURE — 99213 OFFICE O/P EST LOW 20 MIN: CPT | Performed by: FAMILY MEDICINE

## 2019-08-13 PROCEDURE — 97110 THERAPEUTIC EXERCISES: CPT | Mod: GP | Performed by: PHYSICAL THERAPY ASSISTANT

## 2019-08-13 ASSESSMENT — MIFFLIN-ST. JEOR: SCORE: 1564.74

## 2019-08-13 NOTE — PROGRESS NOTES
"Here today to discuss forms to return to work.   He was seen in clinic on 8/6/19 and diagnosed with lumbar strain and SI dysfunction.   He was taken off work for the rest of the week.     He has been off since 7/28/19 - through today 8/13/19.   He has attended 3 PT appts and they have released him to continue doing the exercises at home.     He feels better and able to return to work tomorrow.     Current symptoms:   No pain  Full range of motion of the back/legs/hip     Review of systems:  No tingling/numbness  No weakness  No f/c     /88   Pulse 88   Ht 1.702 m (5' 7\")   Wt 77.1 kg (170 lb)   BMI 26.63 kg/m    Pt is alert and oriented in no acute distress.  Affect is appropriate. Good eye contact.  Normal gait and station   Neuro - reflexes 2+ bilaterally. Sensation intact. LE Strength 5/5. Normal heel and toe walking  Back shows full extension, flexion, and lateral bending. No pain with palpation over bilateral paraspinous muscles. No central tenderness to palpation.  Negative straight leg raising test. Hips show full ROM      Assessment/Plan -    (S39.012D) Strain of lumbar region, subsequent encounter  (primary encounter diagnosis)  Comment: We completed his paperwork together today. We agreed on release to full duty on 8/14/19. He will continue to do his PT exercises. All his questions were answered. No long term disability remains. The patient indicates understanding of these issues and agrees with the plan.   Plan:     TT =  15 minutes, more than half of the time was spent discussing short term disability paperwork, and reviewing records.    MARY Chavez MD   " (4) excellent

## 2019-08-26 DIAGNOSIS — I10 ESSENTIAL HYPERTENSION WITH GOAL BLOOD PRESSURE LESS THAN 140/90: ICD-10-CM

## 2019-08-26 RX ORDER — HYDROCHLOROTHIAZIDE 25 MG/1
25 TABLET ORAL DAILY
Qty: 90 TABLET | Refills: 2 | Status: SHIPPED | OUTPATIENT
Start: 2019-08-26 | End: 2020-05-22

## 2019-08-26 NOTE — TELEPHONE ENCOUNTER
"HYDROCHLOROTHIAZIDE TAB 25MG      Last Written Prescription Date:  6/19/19  Last Fill Quantity: 90,   # refills: 3  Last Office Visit: 8/13/19  Future Office visit:       Requested Prescriptions   Pending Prescriptions Disp Refills     hydrochlorothiazide (HYDRODIURIL) 25 MG tablet [Pharmacy Med Name: HYDROCHLOROTHIAZIDE TAB 25MG] 90 tablet 4     Sig: TAKE 1 TABLET DAILY ( NEED BMP LAB BEFORE FURTHER REFILLS )       Diuretics (Including Combos) Protocol Failed - 8/26/2019 12:06 AM        Failed - Normal serum creatinine on file in past 12 months     Recent Labs   Lab Test 06/19/19  0901   CR 0.62*              Passed - Blood pressure under 140/90 in past 12 months     BP Readings from Last 3 Encounters:   08/13/19 139/88   08/06/19 (!) 136/91   06/19/19 137/86                 Passed - Recent (12 mo) or future (30 days) visit within the authorizing provider's specialty     Patient had office visit in the last 12 months or has a visit in the next 30 days with authorizing provider or within the authorizing provider's specialty.  See \"Patient Info\" tab in inbasket, or \"Choose Columns\" in Meds & Orders section of the refill encounter.              Passed - Medication is active on med list        Passed - Patient is age 18 or older        Passed - Normal serum potassium on file in past 12 months     Recent Labs   Lab Test 06/19/19  0901   POTASSIUM 3.4                    Passed - Normal serum sodium on file in past 12 months     Recent Labs   Lab Test 06/19/19  0901                   "

## 2019-08-26 NOTE — TELEPHONE ENCOUNTER
Prescription approved per [facility] Refill Protocol.  Cecil Burgess RN

## 2019-10-07 NOTE — PROGRESS NOTES
DISCHARGE SUMMARY    Anthony De La Torre was seen 3 times for evaluation and treatment.  Patient did not return for further treatment and current status is unknown.  Due to short treatment duration, no objective or functional changes were made.  Please see goal flow sheet from episode noted date below and initial evaluation for further information.  Patient is discharged from therapy and therapy episode is resolved as of 10/07/19.      Linked Episodes   Type: Episode: Status: Noted: Resolved: Last update: Updated by:   PHYSICAL THERAPY LBP Active 8/7/2019 8/13/2019  4:23 PM Ace Salcedo, PT      Comments:

## 2019-11-04 ENCOUNTER — HEALTH MAINTENANCE LETTER (OUTPATIENT)
Age: 54
End: 2019-11-04

## 2019-11-10 DIAGNOSIS — I10 BENIGN ESSENTIAL HYPERTENSION: ICD-10-CM

## 2019-11-11 NOTE — TELEPHONE ENCOUNTER
"AMLODIPINE BESYLATE TABS 2.5MG      Last Written Prescription Date:  8/12/19  Last Fill Quantity: 90,   # refills: 0  Last Office Visit: 8/13/19  Future Office visit:       Requested Prescriptions   Pending Prescriptions Disp Refills     amLODIPine (NORVASC) 2.5 MG tablet [Pharmacy Med Name: AMLODIPINE BESYLATE TABS 2.5MG] 90 tablet 4     Sig: TAKE 1 TABLET DAILY       Calcium Channel Blockers Protocol  Failed - 11/10/2019  6:50 AM        Failed - Normal serum creatinine on file in past 12 months     Recent Labs   Lab Test 06/19/19  0901   CR 0.62*             Passed - Blood pressure under 140/90 in past 12 months     BP Readings from Last 3 Encounters:   08/13/19 139/88   08/06/19 (!) 136/91   06/19/19 137/86                 Passed - Recent (12 mo) or future (30 days) visit within the authorizing provider's specialty     Patient has had an office visit with the authorizing provider or a provider within the authorizing providers department within the previous 12 mos or has a future within next 30 days. See \"Patient Info\" tab in inbasket, or \"Choose Columns\" in Meds & Orders section of the refill encounter.              Passed - Medication is active on med list        Passed - Patient is age 18 or older          "

## 2019-11-12 RX ORDER — AMLODIPINE BESYLATE 2.5 MG/1
TABLET ORAL
Qty: 90 TABLET | Refills: 2 | Status: SHIPPED | OUTPATIENT
Start: 2019-11-12 | End: 2020-08-06

## 2020-01-08 ENCOUNTER — TRANSFERRED RECORDS (OUTPATIENT)
Dept: HEALTH INFORMATION MANAGEMENT | Facility: CLINIC | Age: 55
End: 2020-01-08

## 2020-02-04 ENCOUNTER — TRANSFERRED RECORDS (OUTPATIENT)
Dept: HEALTH INFORMATION MANAGEMENT | Facility: CLINIC | Age: 55
End: 2020-02-04

## 2020-05-21 DIAGNOSIS — I10 ESSENTIAL HYPERTENSION WITH GOAL BLOOD PRESSURE LESS THAN 140/90: ICD-10-CM

## 2020-05-22 RX ORDER — HYDROCHLOROTHIAZIDE 25 MG/1
TABLET ORAL
Qty: 90 TABLET | Refills: 0 | Status: SHIPPED | OUTPATIENT
Start: 2020-05-22 | End: 2020-09-09

## 2020-05-22 NOTE — TELEPHONE ENCOUNTER
Routing refill request to provider for review/approval because:  Labs out of range:  Issa Aldridge RN

## 2020-05-22 NOTE — TELEPHONE ENCOUNTER
Refilled x1. Due for fasting labs, med check. If possible check home blood pressure a few times prior to visit.  Yamileth Cox PA-C

## 2020-05-22 NOTE — TELEPHONE ENCOUNTER
Call placed to patient.  Message left to have patient call clinic RN, number given.  Klarissa Aldridge RN

## 2020-05-22 NOTE — TELEPHONE ENCOUNTER
Robert returned call.  He does not have a way to check his b/p @ home.  He usually schedules his labs and physical every year in June.  Will wait until July to call and schedule this..Huyen Matamoros

## 2020-06-21 ENCOUNTER — MYC MEDICAL ADVICE (OUTPATIENT)
Dept: FAMILY MEDICINE | Facility: CLINIC | Age: 55
End: 2020-06-21

## 2020-07-02 ENCOUNTER — OFFICE VISIT (OUTPATIENT)
Dept: PODIATRY | Facility: CLINIC | Age: 55
End: 2020-07-02
Payer: COMMERCIAL

## 2020-07-02 VITALS
HEART RATE: 91 BPM | DIASTOLIC BLOOD PRESSURE: 79 MMHG | SYSTOLIC BLOOD PRESSURE: 123 MMHG | HEIGHT: 67 IN | TEMPERATURE: 99.6 F | WEIGHT: 170 LBS | BODY MASS INDEX: 26.68 KG/M2

## 2020-07-02 DIAGNOSIS — M77.51 TENDONITIS OF ANKLE, RIGHT: ICD-10-CM

## 2020-07-02 DIAGNOSIS — M67.471 GANGLION CYST OF RIGHT FOOT: Primary | ICD-10-CM

## 2020-07-02 PROCEDURE — 99203 OFFICE O/P NEW LOW 30 MIN: CPT | Performed by: PODIATRIST

## 2020-07-02 ASSESSMENT — MIFFLIN-ST. JEOR: SCORE: 1559.74

## 2020-07-02 ASSESSMENT — PAIN SCALES - GENERAL: PAINLEVEL: SEVERE PAIN (7)

## 2020-07-02 NOTE — LETTER
7/2/2020         RE: Anthony De La Torre  [address] [address]        Dear Colleague,    Thank you for referring your patient, Anthony De La Torre, to the [facility]. Please see a copy of my visit note below.    PATIENT HISTORY:  Anthony De La Torre is a 54 year old male who presents to clinic for a painful right foot .  The patient describes the pain as sharp aching.  The patient relates the pain level is moderate.  The patient relates pain is located over the right foot.  The patient relates the pain has been present for the past several weeks.  The patient relates pain with ambulation.  The patient has tried different shoes with little relief.       REVIEW OF SYSTEMS:  Constitutional, HEENT, cardiovascular, pulmonary, GI, , musculoskeletal, neuro, skin, endocrine and psych systems are negative, except as otherwise noted.     PAST MEDICAL HISTORY:   Past Medical History:   Diagnosis Date     Lumbago 6/2/2008     NO ACTIVE PROBLEMS         PAST SURGICAL HISTORY:   Past Surgical History:   Procedure Laterality Date     HERNIA REPAIR      Right     PE TUBES  4/05/2010    BMT     SURGICAL HISTORY OF -       Right ankle     VASECTOMY  [id number]    Dr Concepcion        MEDICATIONS:   Current Outpatient Medications:      amLODIPine (NORVASC) 2.5 MG tablet, TAKE 1 TABLET DAILY, Disp: 90 tablet, Rfl: 2     ascorbic acid (VITAMIN C) 1000 MG TABS, Take 1,000 mg by mouth daily., Disp: , Rfl:      aspirin 81 MG chewable tablet, Take 81 mg by mouth daily., Disp: , Rfl:      Bioflavonoid Products (BIOFLEX PO), Take 1-2 tablets by mouth daily., Disp: , Rfl:      cetirizine HCl 10 MG CAPS, Take 25 mg by mouth daily as needed, Disp: , Rfl:      cyclobenzaprine (FLEXERIL) 10 MG tablet, Take 1 tablet (10 mg) by mouth 3 times daily as needed for muscle spasms, Disp: 20 tablet, Rfl: 0     fish oil-omega-3 fatty acids (FISH OIL) 1000 MG capsule, Take 2 g by mouth daily., Disp: , Rfl:       fluticasone (FLONASE) 50 MCG/ACT nasal spray, , Disp: , Rfl:      hydrochlorothiazide (HYDRODIURIL) 25 MG tablet, TAKE 1 TABLET DAILY, Disp: 90 tablet, Rfl: 0     hydrochlorothiazide (HYDRODIURIL) 25 MG tablet, Take 1 tablet (25 mg) by mouth daily, Disp: 90 tablet, Rfl: 3     losartan (COZAAR) 100 MG tablet, TAKE 1 TABLET DAILY, Disp: 90 tablet, Rfl: 3     rosuvastatin (CRESTOR) 10 MG tablet, Take 0.5 tablets (5 mg) by mouth daily, Disp: 45 tablet, Rfl: 3     sildenafil (REVATIO) 20 MG tablet, 2-4 tabs prior to sex, Disp: 90 tablet, Rfl: 3     albuterol (PROAIR HFA/PROVENTIL HFA/VENTOLIN HFA) 108 (90 BASE) MCG/ACT Inhaler, Inhale 2 puffs into the lungs every 6 hours as needed for shortness of breath / dyspnea (Patient not taking: Reported on 8/6/2019), Disp: 1 Inhaler, Rfl: 6     ALLERGIES:    Allergies   Allergen Reactions     Nkda [No Known Drug Allergies]      Seasonal Allergies         SOCIAL HISTORY:   Social History     Socioeconomic History     Marital status:      Spouse name: Albertina De La Torre     Number of children: 2     Years of education: 12+     Highest education level: Not on file   Occupational History     Occupation: [employer]     Employer: [employer]   Social Needs     Financial resource strain: Not on file     Food insecurity     Worry: Not on file     Inability: Not on file     Transportation needs     Medical: Not on file     Non-medical: Not on file   Tobacco Use     Smoking status: Current Every Day Smoker     Packs/day: 1.00     Years: 20.00     Pack years: 20.00     Types: Cigarettes     Smokeless tobacco: Never Used     Tobacco comment: 1.00 ppd or less   Substance and Sexual Activity     Alcohol use: Yes     Comment: Occasional - 6-7 drinks per week     Drug use: No     Sexual activity: Yes     Partners: Female     Birth control/protection: Surgical     Comment: Patient has had a vasectomy   Lifestyle     Physical activity     Days per week: Not on file     Minutes per session: Not on  "file     Stress: Not on file   Relationships     Social connections     Talks on phone: Not on file     Gets together: Not on file     Attends Hinduism service: Not on file     Active member of club or organization: Not on file     Attends meetings of clubs or organizations: Not on file     Relationship status: Not on file     Intimate partner violence     Fear of current or ex partner: Not on file     Emotionally abused: Not on file     Physically abused: Not on file     Forced sexual activity: Not on file   Other Topics Concern     Parent/sibling w/ CABG, MI or angioplasty before 65F 55M? No   Social History Narrative     Not on file        FAMILY HISTORY:   Family History   Problem Relation Age of Onset     Lipids Father      Hypertension Father      Musculoskeletal Disorder Father         Epilepsy     Diabetes Paternal Grandmother      Cancer - colorectal Paternal Grandfather      C.A.D. No family hx of      Cerebrovascular Disease No family hx of      Breast Cancer No family hx of      Prostate Cancer No family hx of         EXAM:Vitals: /79   Pulse 91   Temp 99.6  F (37.6  C) (Tympanic)   Ht 1.702 m (5' 7\")   Wt 77.1 kg (170 lb)   BMI 26.63 kg/m    BMI= Body mass index is 26.63 kg/m .    Weight management plan: Patient was referred to their PCP to discuss a diet and exercise plan.    General appearance: Patient is alert and fully cooperative with history & exam.  No sign of distress is noted during the visit.     Psychiatric: Affect is pleasant & appropriate.  Patient appears motivated to improve health.     Respiratory: Breathing is regular & unlabored while sitting.     HEENT: Hearing is intact to spoken word.  Speech is clear.  No gross evidence of visual impairment that would impact ambulation.     Dermatologic: Skin is intact to right lower extremities without significant lesions, rash or abrasion.  No paronychia or evidence of soft tissue infection is noted.     Vascular: DP & PT pulses are " intact & regular on the right.  No significant edema or varicosities noted.  CFT and skin temperature is normal to the right lower extremities.     Neurologic: Lower extremity sensation is intact to light touch.  No evidence of weakness or contracture in the right lower extremities.  No evidence of neuropathy.     Musculoskeletal: Patient is ambulatory without assistive device or brace.  No gross ankle deformity noted.  No foot or ankle joint effusion is noted.  Noted soft tissue mass along the medial aspect of the right foot.  No surrounding erythema noted.  No ecchymosis noted.       ASSESSMENT / PLAN:     ICD-10-CM    1. Ganglion cyst of right foot  M67.471    2. Tendonitis of ankle, right  M77.51        I have explained to Anthony  about the conditions.  We discussed the nature of the condition as well as the treatment plan and expected length of recovery.  At this time, patient was referred to [facility] for ultrasound-guided evaluation and aspiration/injection of steroid of the cyst on the right foot.    Anthony verbalized agreement with and understanding of the rational for the diagnosis and treatment plan.  All questions were answered to best of my ability and the patient's satisfaction. The patient was advised to contact the clinic with any questions that may arise after the clinic visit.      Disclaimer: This note consists of symbols derived from keyboarding, dictation and/or voice recognition software. As a result, there may be errors in the script that have gone undetected. Please consider this when interpreting information found in this chart.       ANSHUL Mora D.P.M., F.MEGAN.C.F.A.S.        Again, thank you for allowing me to participate in the care of your patient.        Sincerely,        Stanley Mora DPM

## 2020-07-02 NOTE — PROGRESS NOTES
PATIENT HISTORY:  Anthony De La Torre is a 54 year old male who presents to clinic for a painful right foot .  The patient describes the pain as sharp aching.  The patient relates the pain level is moderate.  The patient relates pain is located over the right foot.  The patient relates the pain has been present for the past several weeks.  The patient relates pain with ambulation.  The patient has tried different shoes with little relief.       REVIEW OF SYSTEMS:  Constitutional, HEENT, cardiovascular, pulmonary, GI, , musculoskeletal, neuro, skin, endocrine and psych systems are negative, except as otherwise noted.     PAST MEDICAL HISTORY:   Past Medical History:   Diagnosis Date     Lumbago 6/2/2008     NO ACTIVE PROBLEMS         PAST SURGICAL HISTORY:   Past Surgical History:   Procedure Laterality Date     HERNIA REPAIR      Right     PE TUBES  4/05/2010    BMT     SURGICAL HISTORY OF -       Right ankle     VASECTOMY  [id number]    Dr Concepcion        MEDICATIONS:   Current Outpatient Medications:      amLODIPine (NORVASC) 2.5 MG tablet, TAKE 1 TABLET DAILY, Disp: 90 tablet, Rfl: 2     ascorbic acid (VITAMIN C) 1000 MG TABS, Take 1,000 mg by mouth daily., Disp: , Rfl:      aspirin 81 MG chewable tablet, Take 81 mg by mouth daily., Disp: , Rfl:      Bioflavonoid Products (BIOFLEX PO), Take 1-2 tablets by mouth daily., Disp: , Rfl:      cetirizine HCl 10 MG CAPS, Take 25 mg by mouth daily as needed, Disp: , Rfl:      cyclobenzaprine (FLEXERIL) 10 MG tablet, Take 1 tablet (10 mg) by mouth 3 times daily as needed for muscle spasms, Disp: 20 tablet, Rfl: 0     fish oil-omega-3 fatty acids (FISH OIL) 1000 MG capsule, Take 2 g by mouth daily., Disp: , Rfl:      fluticasone (FLONASE) 50 MCG/ACT nasal spray, , Disp: , Rfl:      hydrochlorothiazide (HYDRODIURIL) 25 MG tablet, TAKE 1 TABLET DAILY, Disp: 90 tablet, Rfl: 0     hydrochlorothiazide (HYDRODIURIL) 25 MG tablet, Take 1 tablet (25 mg) by mouth daily, Disp: 90 tablet,  Rfl: 3     losartan (COZAAR) 100 MG tablet, TAKE 1 TABLET DAILY, Disp: 90 tablet, Rfl: 3     rosuvastatin (CRESTOR) 10 MG tablet, Take 0.5 tablets (5 mg) by mouth daily, Disp: 45 tablet, Rfl: 3     sildenafil (REVATIO) 20 MG tablet, 2-4 tabs prior to sex, Disp: 90 tablet, Rfl: 3     albuterol (PROAIR HFA/PROVENTIL HFA/VENTOLIN HFA) 108 (90 BASE) MCG/ACT Inhaler, Inhale 2 puffs into the lungs every 6 hours as needed for shortness of breath / dyspnea (Patient not taking: Reported on 8/6/2019), Disp: 1 Inhaler, Rfl: 6     ALLERGIES:    Allergies   Allergen Reactions     Nkda [No Known Drug Allergies]      Seasonal Allergies         SOCIAL HISTORY:   Social History     Socioeconomic History     Marital status:      Spouse name: Albertina De La Torre     Number of children: 2     Years of education: 12+     Highest education level: Not on file   Occupational History     Occupation: [employer]     Employer: [employer]   Social Needs     Financial resource strain: Not on file     Food insecurity     Worry: Not on file     Inability: Not on file     Transportation needs     Medical: Not on file     Non-medical: Not on file   Tobacco Use     Smoking status: Current Every Day Smoker     Packs/day: 1.00     Years: 20.00     Pack years: 20.00     Types: Cigarettes     Smokeless tobacco: Never Used     Tobacco comment: 1.00 ppd or less   Substance and Sexual Activity     Alcohol use: Yes     Comment: Occasional - 6-7 drinks per week     Drug use: No     Sexual activity: Yes     Partners: Female     Birth control/protection: Surgical     Comment: Patient has had a vasectomy   Lifestyle     Physical activity     Days per week: Not on file     Minutes per session: Not on file     Stress: Not on file   Relationships     Social connections     Talks on phone: Not on file     Gets together: Not on file     Attends [employer] service: Not on file     Active member of club or organization: Not on file     Attends meetings of clubs or  "organizations: Not on file     Relationship status: Not on file     Intimate partner violence     Fear of current or ex partner: Not on file     Emotionally abused: Not on file     Physically abused: Not on file     Forced sexual activity: Not on file   Other Topics Concern     Parent/sibling w/ CABG, MI or angioplasty before 65F 55M? No   Social History Narrative     Not on file        FAMILY HISTORY:   Family History   Problem Relation Age of Onset     Lipids Father      Hypertension Father      Musculoskeletal Disorder Father         Epilepsy     Diabetes Paternal Grandmother      Cancer - colorectal Paternal Grandfather      C.A.D. No family hx of      Cerebrovascular Disease No family hx of      Breast Cancer No family hx of      Prostate Cancer No family hx of         EXAM:Vitals: /79   Pulse 91   Temp 99.6  F (37.6  C) (Tympanic)   Ht 1.702 m (5' 7\")   Wt 77.1 kg (170 lb)   BMI 26.63 kg/m    BMI= Body mass index is 26.63 kg/m .    Weight management plan: Patient was referred to their PCP to discuss a diet and exercise plan.    General appearance: Patient is alert and fully cooperative with history & exam.  No sign of distress is noted during the visit.     Psychiatric: Affect is pleasant & appropriate.  Patient appears motivated to improve health.     Respiratory: Breathing is regular & unlabored while sitting.     HEENT: Hearing is intact to spoken word.  Speech is clear.  No gross evidence of visual impairment that would impact ambulation.     Dermatologic: Skin is intact to right lower extremities without significant lesions, rash or abrasion.  No paronychia or evidence of soft tissue infection is noted.     Vascular: DP & PT pulses are intact & regular on the right.  No significant edema or varicosities noted.  CFT and skin temperature is normal to the right lower extremities.     Neurologic: Lower extremity sensation is intact to light touch.  No evidence of weakness or contracture in the " right lower extremities.  No evidence of neuropathy.     Musculoskeletal: Patient is ambulatory without assistive device or brace.  No gross ankle deformity noted.  No foot or ankle joint effusion is noted.  Noted soft tissue mass along the medial aspect of the right foot.  No surrounding erythema noted.  No ecchymosis noted.       ASSESSMENT / PLAN:     ICD-10-CM    1. Ganglion cyst of right foot  M67.471    2. Tendonitis of ankle, right  M77.51        I have explained to Anthony  about the conditions.  We discussed the nature of the condition as well as the treatment plan and expected length of recovery.  At this time, patient was referred to [facility] for ultrasound-guided evaluation and aspiration/injection of steroid of the cyst on the right foot.    Anthony verbalized agreement with and understanding of the rational for the diagnosis and treatment plan.  All questions were answered to best of my ability and the patient's satisfaction. The patient was advised to contact the clinic with any questions that may arise after the clinic visit.      Disclaimer: This note consists of symbols derived from keyboarding, dictation and/or voice recognition software. As a result, there may be errors in the script that have gone undetected. Please consider this when interpreting information found in this chart.       ANSHUL Mora D.P.M., F.MEGAN.C.F.A.S.

## 2020-07-02 NOTE — PATIENT INSTRUCTIONS
Initial musculoskeletal treatment recommendation:    1.  Wear supportive foot wear (stiff soles) and/or arch supports (rigid not cushion).  2.  Stretch the calf muscles as instructed once an hour.  3.  Massage the soft tissues around the injured area in the morning to loosen the tissue with an over the counter product like Icy Hot with Lidocaine  4.  Ice the injured area in the evening; 20 min on/off.  5. Take antiinflammatory medication as indicated.    If no improvement in symptoms within four to six weeks, return to clinic for reevaluation.    SMOKING CESSATION  What's in cigarette smoke? - Cigarette smoke contains over 4,000 chemicals. Nicotine is one of the main ingredients which is an insecticide/herbicide. It is poisonous to our nervous system, increases blood clotting risk, and decreases the body's defenses to fight off infection. Another chemical is Carbon Monoxide is an asphyxiating gas that permanently binds to blood cells and blocks the transport of oxygen. This leads to tissue death and decreases your metabolism. Tar is a chemical that coats your lungs and trachea which impairs new oxygen coming in and carbon dioxide getting out of your body.   How does smoking impact surgery? - Smoking is particularly hazardous with regards to surgery. Surgery puts stress on the body and a smoker's body is already under strain from these chemicals. Putting the two together, especially for an elective surgery, could be a recipe for disaster. Smoking before and after surgery increases your risk of heart problems, slow wound healing, delayed bone healing, blood clots, wound infection and anesthesia complications.   What are the benefits of quitting? - In 20 minutes your blood pressure will drop back down to normal. In 8 hours the carbon monoxide (a toxic gas) levels in your blood stream will drop by half, and oxygen levels will return to normal. In 48 hours your chance of having a heart attack will have decreased. All  nicotine will have left your body. Your sense of taste and smell will return to a normal level. In 72 hours your bronchial tubes will relax, and your energy levels will increase. In 2 weeks your circulation will increase, and it will continue to improve for the next 10 weeks.    Recommendations for elective surgery - Ideally, patients should quit smoking 8 weeks before and at least 2 weeks after elective surgery in order to avoid complications. Simply cutting back on the amount of cigarettes smoked per day does not offer any benefit or decrease the risk of poor wound healing, heart problems, and infection. Smokers should also start taking Vitamin C and B for two weeks before surgery and two weeks after surgery.    Ways to Stop Smokin. Nicotine patches, lozenges, or gum  2. Support Groups  3. Medications (see below)    List of Medications:  1. Varenicline Tartrate (CHANTIX)   2. Bupropion HCL (WELLBUTRIN, ZYBAN) - note: make sure Wellbutrin is for smoking cessation and not other issues   3. Nicotine Patch (NICODERM)   4. Nicotine Inhaler (NICOTROL)   5. Nicotine Gum Nicotine Polacrilex   6. Nicotine Lozenge: Nicotine Polacrilex (COMMIT)   * [place] offers a smoking support group as well!  Please visit: [email or web address]  If you are interested in these, ask about getting a prescription or talk to your primary care doctor about what may be the best way for you to quit.

## 2020-07-02 NOTE — NURSING NOTE
"Chief Complaint   Patient presents with     Cyst     right foot       Initial /79   Pulse 91   Temp 99.6  F (37.6  C) (Tympanic)   Ht 1.702 m (5' 7\")   Wt 77.1 kg (170 lb)   BMI 26.63 kg/m   Estimated body mass index is 26.63 kg/m  as calculated from the following:    Height as of this encounter: 1.702 m (5' 7\").    Weight as of this encounter: 77.1 kg (170 lb).  Medications and allergies reviewed.      Anisha NAGY MA    "

## 2020-07-06 DIAGNOSIS — I10 ESSENTIAL HYPERTENSION WITH GOAL BLOOD PRESSURE LESS THAN 140/90: ICD-10-CM

## 2020-07-06 RX ORDER — HYDROCHLOROTHIAZIDE 25 MG/1
25 TABLET ORAL DAILY
Qty: 90 TABLET | Refills: 3 | Status: SHIPPED | OUTPATIENT
Start: 2020-07-06 | End: 2021-07-26

## 2020-07-14 ENCOUNTER — OFFICE VISIT (OUTPATIENT)
Dept: ORTHOPEDICS | Facility: CLINIC | Age: 55
End: 2020-07-14
Payer: COMMERCIAL

## 2020-07-14 DIAGNOSIS — M67.471 GANGLION CYST OF RIGHT FOOT: ICD-10-CM

## 2020-07-14 PROCEDURE — 20612 ASPIRATE/INJ GANGLION CYST: CPT | Mod: RT | Performed by: FAMILY MEDICINE

## 2020-07-14 PROCEDURE — 76942 ECHO GUIDE FOR BIOPSY: CPT | Performed by: FAMILY MEDICINE

## 2020-07-14 RX ADMIN — ROPIVACAINE HYDROCHLORIDE 0.5 ML: 5 INJECTION, SOLUTION EPIDURAL; INFILTRATION; PERINEURAL at 15:50

## 2020-07-14 RX ADMIN — BETAMETHASONE SODIUM PHOSPHATE AND BETAMETHASONE ACETATE 6 MG: 3; 3 INJECTION, SUSPENSION INTRA-ARTICULAR; INTRALESIONAL; INTRAMUSCULAR; SOFT TISSUE at 15:50

## 2020-07-14 NOTE — PROGRESS NOTES
Small Joint Injection/Arthrocentesis    Date/Time: 7/14/2020 3:50 PM  Performed by: Armand Elizabeth MD  Authorized by: Armand Elizabeth MD   Indications:  Pain  Needle Size:  18 G  Guidance: ultrasound     Approach:  Lateral  Location:  Foot   Location comment:  Right foot cyst aspiration                       Medications:  6 mg betamethasone acet & sod phos 6 (3-3) MG/ML; 0.5 mL ropivacaine 5 MG/ML   Medications comment:  Actual amount of celestone used 0.5 mL  Aspirate amount (mL):  0.3    Aspirate:  Bloody      Outcome:  Tolerated well, no immediate complications  Procedure discussed: discussed risks, benefits, and alternatives    Consent Given by:  Patient  Timeout: timeout called immediately prior to procedure    Prep: patient was prepped and draped in usual sterile fashion       Patient counseled on risks of infection related to steroids and COVID-19.  Patient reported improvement of pain after injection.  Aftercare instructions given to patient.  Plan to follow-up as previously discussed with referring provider.     Armand Elizabeth MD [facility]

## 2020-07-14 NOTE — PATIENT INSTRUCTIONS
[facility] [place]    Procedure: Right Foot Cyst Aspiration/Steroid Injection      You may shower, however avoid swimming, tub baths or hot tubs for 24 hours following your procedure    You may have a mild to moderate increase in pain for several days following the injection.    It may take up to 14 days for the steroid medication to start working although you may feel the effect as early as a few days after the procedure.    You may use ice packs for 10-15 minutes, 3 to 4 times a day at the injection site for comfort    You may use anti-inflammatory medications (such as Ibuprofen or Aleve or Advil) or Tylenol for pain control if necessary    If you were fasting, you may resume your normal diet and medications after the procedure    If you have diabetes, check your blood sugar more frequently than usual as your blood sugar may be higher than normal for 10-14 days following a steroid injection. Contact your doctor who manages your diabetes if your blood sugar is higher than usual      If you experience any of the following, call [facility] @ [phone] or [phone]  -Fever over 100 degree F  -Swelling, bleeding, redness, drainage, warmth at the injection site  - New or worsening pain      Patient Education     Ganglion Cyst: Foot  A ganglion is a fluid-filled swelling of the lining of a joint or tendon. Ganglions can form on any part of the foot. But they most often appear on the ankle or top of the foot. Ganglions tend to change in size and often grow slowly.  Causes  Repeated irritation can weaken the lining of a joint or tendon. This can lead to ganglions. Bony outgrowths (bone spurs) and arthritis may also cause ganglions by irritating the joints and tendons.  Symptoms  Ganglions often form with no symptoms. But the ganglion may put pressure on the nerves and the overlying skin. This can cause tingling, numbness, or pain. Ganglions sometimes swell. Their size can change with different activities  or a change in weather.  How are they diagnosed?  Ganglions are sometimes mistaken for tumors. So it s important to have a complete exam done. Tests may be done to confirm the diagnosis.  Health history  Your healthcare provider will ask you questions. These include how long you ve had the ganglion and what kind of symptoms you re feeling. He or she may ask if it s changed in size or if its size varies based on your activities.  Physical exam  Your healthcare provider may do a translumination exam. This involves shining a light through the swelling. You can often see through a ganglion, but not through a tumor.) When your foot is pressed (palpated), a ganglion feels spongy and the fluid moves from side to side.  Tests  If a bone spur or arthritis is suspected, X-rays may be needed. Fluid removal (needle aspiration) may be done. It also helps to decrease pain. To confirm a ganglion, MRI may be done. This reveals images of soft tissue and bone. Sometimes, special dyes may be injected into the area to show the outline.  How are ganglions treated?  Ganglions may be hard to treat without surgery. But nonsurgical methods may be helpful in relieving some of your symptoms.  Nonsurgical care    Pads placed around the ganglion can ease pressure and friction.    Fluid removal may also relieve symptoms. This is done with a needle. A steroid may be injected at the same time. But ganglions may come back.    Limiting movements or activities that increase pain may bring relief.    Icing the ganglion for 15 to 20 minutes may relieve inflammation and pain for a short time.    If inflammation is severe, your healthcare provider may treat your symptoms with medicine.  Surgical treatment  Surgery may be needed if a ganglion is causing ongoing or severe pain. The entire ganglion wall is removed during the procedure. Some nearby tissue may also be removed.  After surgery  You may feel pain, swelling, numbness, or tingling for several  weeks following surgery. You should be able to walk soon afterward. But your foot may need to be wrapped or in a cast, boot, or hard shoe. See your healthcare provider if you notice any future problems. Surgery is often successful. But there is a chance that the ganglion will come back.  Date Last Reviewed: 5/1/2018 2000-2019 [employer]. [address] [place] [address], [place] [id number]. All rights reserved. This information is not intended as a substitute for professional medical care. Always follow your healthcare professional's instructions.

## 2020-07-14 NOTE — LETTER
7/14/2020         RE: Anthony De La Torre  [address] [id number]        Dear Colleague,    Thank you for referring your patient, Anthony De La Torre, to the [facility]. Please see a copy of my visit note below.    Small Joint Injection/Arthrocentesis    Date/Time: 7/14/2020 3:50 PM  Performed by: Armand Elizabeth MD  Authorized by: Armand Elizabeth MD   Indications:  Pain  Needle Size:  18 G  Guidance: ultrasound     Approach:  Lateral  Location:  Foot   Location comment:  Right foot cyst aspiration                       Medications:  6 mg betamethasone acet & sod phos 6 (3-3) MG/ML; 0.5 mL ropivacaine 5 MG/ML   Medications comment:  Actual amount of celestone used 0.5 mL  Aspirate amount (mL):  0.3    Aspirate:  Bloody      Outcome:  Tolerated well, no immediate complications  Procedure discussed: discussed risks, benefits, and alternatives    Consent Given by:  Patient  Timeout: timeout called immediately prior to procedure    Prep: patient was prepped and draped in usual sterile fashion       Patient counseled on risks of infection related to steroids and COVID-19.  Patient reported improvement of pain after injection.  Aftercare instructions given to patient.  Plan to follow-up as previously discussed with referring provider.     Armand Elizabeth MD [facility]                Again, thank you for allowing me to participate in the care of your patient.        Sincerely,        Armand Elizabeth MD

## 2020-07-16 RX ORDER — ROPIVACAINE HYDROCHLORIDE 5 MG/ML
0.5 INJECTION, SOLUTION EPIDURAL; INFILTRATION; PERINEURAL
Status: SHIPPED | OUTPATIENT
Start: 2020-07-14

## 2020-07-16 RX ORDER — BETAMETHASONE SODIUM PHOSPHATE AND BETAMETHASONE ACETATE 3; 3 MG/ML; MG/ML
6 INJECTION, SUSPENSION INTRA-ARTICULAR; INTRALESIONAL; INTRAMUSCULAR; SOFT TISSUE
Status: SHIPPED | OUTPATIENT
Start: 2020-07-14

## 2020-07-29 ENCOUNTER — OFFICE VISIT (OUTPATIENT)
Dept: FAMILY MEDICINE | Facility: CLINIC | Age: 55
End: 2020-07-29
Payer: COMMERCIAL

## 2020-07-29 ENCOUNTER — ANCILLARY PROCEDURE (OUTPATIENT)
Dept: GENERAL RADIOLOGY | Facility: CLINIC | Age: 55
End: 2020-07-29
Attending: PHYSICIAN ASSISTANT
Payer: COMMERCIAL

## 2020-07-29 VITALS
OXYGEN SATURATION: 99 % | TEMPERATURE: 97.8 F | RESPIRATION RATE: 12 BRPM | DIASTOLIC BLOOD PRESSURE: 94 MMHG | SYSTOLIC BLOOD PRESSURE: 154 MMHG | HEART RATE: 79 BPM | BODY MASS INDEX: 27 KG/M2 | HEIGHT: 67 IN | WEIGHT: 172 LBS

## 2020-07-29 DIAGNOSIS — I10 ESSENTIAL HYPERTENSION WITH GOAL BLOOD PRESSURE LESS THAN 140/90: ICD-10-CM

## 2020-07-29 DIAGNOSIS — M79.644 PAIN OF FINGER OF RIGHT HAND: Primary | ICD-10-CM

## 2020-07-29 DIAGNOSIS — E78.5 HYPERLIPIDEMIA WITH TARGET LDL LESS THAN 130: Primary | ICD-10-CM

## 2020-07-29 PROCEDURE — 99213 OFFICE O/P EST LOW 20 MIN: CPT | Performed by: PHYSICIAN ASSISTANT

## 2020-07-29 PROCEDURE — 73130 X-RAY EXAM OF HAND: CPT | Mod: RT

## 2020-07-29 RX ORDER — HYDROCHLOROTHIAZIDE 25 MG/1
25 TABLET ORAL DAILY
Qty: 90 TABLET | Refills: 3 | Status: CANCELLED | OUTPATIENT
Start: 2020-07-29

## 2020-07-29 ASSESSMENT — ENCOUNTER SYMPTOMS
PARESTHESIAS: 0
NERVOUS/ANXIOUS: 0
ABDOMINAL PAIN: 0
WEAKNESS: 0
FEVER: 0
LIGHT-HEADEDNESS: 0
COUGH: 0
SHORTNESS OF BREATH: 0

## 2020-07-29 ASSESSMENT — PAIN SCALES - GENERAL: PAINLEVEL: SEVERE PAIN (6)

## 2020-07-29 ASSESSMENT — MIFFLIN-ST. JEOR: SCORE: 1568.82

## 2020-07-29 NOTE — PROGRESS NOTES
Subjective     Anthony De La Torre is a 54 year old male who presents to clinic today for the following health issues:    HPI     Starting 5 days ago patient noted stiffness and tenderness in the right 3rd finger without redness or swelling. He pulled on the finger to straighten it out stating this is what he usually does to help his fingers straighten out. Afterwards he noticed pain in the MCP joint and swelling. Patient used ice, Ibuprofen and rest for treatment. No prior surgery or injuries.     Joint Pain    Onset: 4 days    Description:   Location: left hand  Character: Sharp, Gnawing and Fullness    Intensity: moderate    Progression of Symptoms: better    Accompanying Signs & Symptoms:  Other symptoms: swelling    History:   Previous similar pain: no       Precipitating factors:   Trauma or overuse: no     Alleviating factors:  Improved by: rest/inactivity    Therapies Tried and outcome: ice, ibuprofen helped some    Patient Active Problem List   Diagnosis     Plantar fascial fibromatosis     Achilles tendonitis     Fatigue     HTN (hypertension)     Intermittent asthma     Sensorineural hearing loss, asymmetrical     Hyperlipidemia with target LDL less than 130     Tinnitus of both ears     Cervicalgia     Mild intermittent asthma without complication     Essential hypertension with goal blood pressure less than 140/90     Colon polyps-tubular adenoma     Sacroiliitis (H)     Lumbar strain, initial encounter     Past Surgical History:   Procedure Laterality Date     HERNIA REPAIR      Right     PE TUBES  4/05/2010    BMT     SURGICAL HISTORY OF -       Right ankle     VASECTOMY  [id number]    Dr Concepcion       Social History     Tobacco Use     Smoking status: Current Every Day Smoker     Packs/day: 1.00     Years: 20.00     Pack years: 20.00     Types: Cigarettes     Smokeless tobacco: Never Used     Tobacco comment: 1.00 ppd or less   Substance Use Topics     Alcohol use: Yes     Comment: Occasional - 6-7 drinks  per week     Family History   Problem Relation Age of Onset     Lipids Father      Hypertension Father      Musculoskeletal Disorder Father         Epilepsy     Diabetes Paternal Grandmother      Cancer - colorectal Paternal Grandfather      C.A.D. No family hx of      Cerebrovascular Disease No family hx of      Breast Cancer No family hx of      Prostate Cancer No family hx of          Current Outpatient Medications   Medication Sig Dispense Refill     amLODIPine (NORVASC) 2.5 MG tablet TAKE 1 TABLET DAILY 90 tablet 2     ascorbic acid (VITAMIN C) 1000 MG TABS Take 1,000 mg by mouth daily.       aspirin 81 MG chewable tablet Take 81 mg by mouth daily.       Bioflavonoid Products (BIOFLEX PO) Take 1-2 tablets by mouth daily.       cetirizine HCl 10 MG CAPS Take 25 mg by mouth daily as needed       cyclobenzaprine (FLEXERIL) 10 MG tablet Take 1 tablet (10 mg) by mouth 3 times daily as needed for muscle spasms 20 tablet 0     fish oil-omega-3 fatty acids (FISH OIL) 1000 MG capsule Take 2 g by mouth daily.       fluticasone (FLONASE) 50 MCG/ACT nasal spray        hydrochlorothiazide (HYDRODIURIL) 25 MG tablet Take 1 tablet (25 mg) by mouth daily 90 tablet 3     hydrochlorothiazide (HYDRODIURIL) 25 MG tablet TAKE 1 TABLET DAILY 90 tablet 0     losartan (COZAAR) 100 MG tablet TAKE 1 TABLET DAILY 90 tablet 3     rosuvastatin (CRESTOR) 10 MG tablet Take 0.5 tablets (5 mg) by mouth daily 45 tablet 3     sildenafil (REVATIO) 20 MG tablet 2-4 tabs prior to sex 90 tablet 3     albuterol (PROAIR HFA/PROVENTIL HFA/VENTOLIN HFA) 108 (90 BASE) MCG/ACT Inhaler Inhale 2 puffs into the lungs every 6 hours as needed for shortness of breath / dyspnea (Patient not taking: Reported on 8/6/2019) 1 Inhaler 6     Allergies   Allergen Reactions     Nkda [No Known Drug Allergies]      Seasonal Allergies        Reviewed and updated as needed this visit by Provider         Review of Systems   Constitutional: Negative for fever.   HENT:  "Negative for congestion.    Respiratory: Negative for cough and shortness of breath.    Cardiovascular: Negative for chest pain.   Gastrointestinal: Negative for abdominal pain.   Skin: Negative for rash.   Neurological: Negative for weakness, light-headedness and paresthesias.   Psychiatric/Behavioral: The patient is not nervous/anxious.             Objective    BP (!) 154/94 (BP Location: Left arm, Patient Position: Chair, Cuff Size: Adult Regular)   Pulse 79   Temp 97.8  F (36.6  C) (Tympanic)   Resp 12   Ht 1.702 m (5' 7\")   Wt 78 kg (172 lb)   SpO2 99%   BMI 26.94 kg/m    Body mass index is 26.94 kg/m .  Physical Exam  Vitals signs and nursing note reviewed.   Constitutional:       General: He is not in acute distress.     Appearance: Normal appearance.   HENT:      Head: Normocephalic and atraumatic.   Eyes:      Extraocular Movements: Extraocular movements intact.      Pupils: Pupils are equal, round, and reactive to light.   Neck:      Musculoskeletal: Normal range of motion.   Cardiovascular:      Rate and Rhythm: Normal rate and regular rhythm.      Heart sounds: Normal heart sounds.   Pulmonary:      Effort: Pulmonary effort is normal.      Breath sounds: Normal breath sounds.   Musculoskeletal: Normal range of motion.      Comments: Elbow, wrist, and hand ROM WNL and equal bilaterally.    strength, interosseous strength 5/5 bilaterally.  Third finger flexion 5/5 bilaterally.  No deformity.  Mild swelling and ecchymosis between right third and fourth MCP joints.  No tenderness palpation of right third finger or metacarpal.    Skin:     General: Skin is warm and dry.      Comments: Sensation intact to bilateral hands in all dermatomes    Neurological:      General: No focal deficit present.      Mental Status: He is alert.   Psychiatric:         Mood and Affect: Mood normal.         Behavior: Behavior normal.            Diagnostic Test Results:  Labs reviewed in Epic  Xray - Right Hand 3 views: " Per my interpretation, no fracture, dislocation.         Assessment & Plan     1. Pain of finger of right hand  X-rays negative for fracture or or dislocation.  Symptoms may be due to mild arthritis and use of hands at work.  Recommended Tylenol/ice and ibuprofen.  Patient may benefit from hand therapy.  Referral placed.  Recommended follow-up if symptoms not improving with treatment.    - XR Hand Right G/E 3 Views  - [facility]; Future     See Patient Instructions    Return in about 4 weeks (around 8/26/2020), or if symptoms worsen or fail to improve.    MICHELLE YunC  [facility]

## 2020-07-29 NOTE — LETTER
July 29, 2020      Anthony De La Torre  [address] [id number]        To Whom It May Concern:    Anthony De La Torre was seen in our clinic. He may return to work without restrictions 7/30/20.      Sincerely,        Sowmya Garcia PA-C

## 2020-07-29 NOTE — PATIENT INSTRUCTIONS
Your x-rays do not show signs of fracture or dislocation.  Your symptoms may be due to mild arthritis in your hands and having to use yout hands daily at work.  For pain please use Tylenol/ibuprofen and ice.  A referral has been placed to physical therapy.  Please call to schedule your appointment.  Follow-up in clinic if symptoms worsen, or not improving with therapy and treatment.    Patient Education     What Is Arthritis?  Arthritis is a disease that affects the joints. Joints are the parts where bones meet and move. It can affect any joint in your body. There are many types of arthritis. They include:     Osteoarthritis    Rheumatoid arthritis    Gout    Lupus  If your symptoms are mild, medicines may be enough to ease pain and swelling. For more severe arthritis, you may need surgery. This can improve the condition of the joint. Or it can replace part or all of the joint.      What causes arthritis?  Cartilage is a smooth substance that protects the ends of your bones and provides cushioning. When you have arthritis, this cartilage breaks down and can no longer protect your bones. This can happen from an autoimmune disease. Or it can happen from wear and tear, infections, or trauma. The bones rub against each other, causing pain and swelling. Over time, small pieces of rough or splintered bone (bone spurs) may develop. The joint's range of motion can become limited.  Symptoms  Some of the more common symptoms of arthritis include:    Joint pain and stiffness. These symptoms get worse with long periods of rest. They may also get worse from using a joint too long or too hard.    Joints that have lost normal shape and motion. They may look swollen and be hard to move.    Sore, inflamed joints. They may look red and feel warm.    Grinding or popping noise. This happens with joint movement.    Fatigue. You may feel tired all the time.  Reducing symptoms     You can help ease symptoms in these ways:    Losing  weight    Exercising    Strengthening muscles around the joint to reduce the strain on the joint    Using hot and cold packs on your joints    Using over-the-counter and prescription medicines  Talk with your healthcare provider about the best treatments for your condition.  Date Last Reviewed:     9706-4315 [employer]. [address] [place] [id number]. All rights reserved. This information is not intended as a substitute for professional medical care. Always follow your healthcare professional's instructions.

## 2020-07-29 NOTE — TELEPHONE ENCOUNTER
hydrochlorothiazide (HYDRODIURIL) 25 MG tablet  90 tablet  3  7/6/2020   No    Sig - Route: Take 1 tablet (25 mg) by mouth daily - Oral    Sent to pharmacy as: hydroCHLOROthiazide 25 MG Oral Tablet (HYDRODIURIL)    Class: E-Prescribe    Order: [id number]    E-Prescribing Status: Receipt confirmed by pharmacy (7/6/2020  1:41 PM CDT)    Printout Tracking     External Result Report    Pharmacy     [facility] - [facility], [place] [address]

## 2020-07-30 ASSESSMENT — ASTHMA QUESTIONNAIRES: ACT_TOTALSCORE: 23

## 2020-08-05 DIAGNOSIS — I10 BENIGN ESSENTIAL HYPERTENSION: ICD-10-CM

## 2020-08-06 RX ORDER — AMLODIPINE BESYLATE 2.5 MG/1
TABLET ORAL
Qty: 90 TABLET | Refills: 3 | Status: SHIPPED | OUTPATIENT
Start: 2020-08-06 | End: 2021-08-02

## 2020-09-04 ENCOUNTER — MYC REFILL (OUTPATIENT)
Dept: FAMILY MEDICINE | Facility: CLINIC | Age: 55
End: 2020-09-04

## 2020-09-04 DIAGNOSIS — I10 ESSENTIAL HYPERTENSION WITH GOAL BLOOD PRESSURE LESS THAN 140/90: ICD-10-CM

## 2020-09-08 NOTE — TELEPHONE ENCOUNTER
Routing refill request to provider for review/approval because:  Please advise losartan refill at his 9/9/20 office visit.  Thank you.  Cecil Burgess RN

## 2020-09-09 ENCOUNTER — OFFICE VISIT (OUTPATIENT)
Dept: FAMILY MEDICINE | Facility: CLINIC | Age: 55
End: 2020-09-09
Payer: COMMERCIAL

## 2020-09-09 VITALS
BODY MASS INDEX: 26.81 KG/M2 | TEMPERATURE: 97.7 F | HEART RATE: 86 BPM | DIASTOLIC BLOOD PRESSURE: 82 MMHG | SYSTOLIC BLOOD PRESSURE: 128 MMHG | WEIGHT: 171.2 LBS

## 2020-09-09 DIAGNOSIS — E78.5 HYPERLIPIDEMIA WITH TARGET LDL LESS THAN 130: ICD-10-CM

## 2020-09-09 DIAGNOSIS — Z12.11 SCREEN FOR COLON CANCER: ICD-10-CM

## 2020-09-09 DIAGNOSIS — Z72.0 TOBACCO ABUSE DISORDER: ICD-10-CM

## 2020-09-09 DIAGNOSIS — Z00.00 ROUTINE GENERAL MEDICAL EXAMINATION AT A HEALTH CARE FACILITY: ICD-10-CM

## 2020-09-09 DIAGNOSIS — I10 ESSENTIAL HYPERTENSION WITH GOAL BLOOD PRESSURE LESS THAN 140/90: ICD-10-CM

## 2020-09-09 DIAGNOSIS — Z00.01 ENCOUNTER FOR ROUTINE ADULT MEDICAL EXAM WITH ABNORMAL FINDINGS: ICD-10-CM

## 2020-09-09 DIAGNOSIS — M19.90 ARTHRITIS: Primary | ICD-10-CM

## 2020-09-09 DIAGNOSIS — Z23 NEED FOR VACCINATION: ICD-10-CM

## 2020-09-09 LAB
ANION GAP SERPL CALCULATED.3IONS-SCNC: 4 MMOL/L (ref 3–14)
BUN SERPL-MCNC: 17 MG/DL (ref 7–30)
CALCIUM SERPL-MCNC: 9.3 MG/DL (ref 8.5–10.1)
CHLORIDE SERPL-SCNC: 105 MMOL/L (ref 94–109)
CHOLEST SERPL-MCNC: 220 MG/DL
CO2 SERPL-SCNC: 28 MMOL/L (ref 20–32)
CREAT SERPL-MCNC: 0.68 MG/DL (ref 0.66–1.25)
GFR SERPL CREATININE-BSD FRML MDRD: >90 ML/MIN/{1.73_M2}
GLUCOSE SERPL-MCNC: 114 MG/DL (ref 70–99)
HDLC SERPL-MCNC: 45 MG/DL
LDLC SERPL CALC-MCNC: 135 MG/DL
NONHDLC SERPL-MCNC: 175 MG/DL
POTASSIUM SERPL-SCNC: 3.8 MMOL/L (ref 3.4–5.3)
SODIUM SERPL-SCNC: 137 MMOL/L (ref 133–144)
TRIGL SERPL-MCNC: 200 MG/DL

## 2020-09-09 PROCEDURE — 80048 BASIC METABOLIC PNL TOTAL CA: CPT | Performed by: FAMILY MEDICINE

## 2020-09-09 PROCEDURE — 80061 LIPID PANEL: CPT | Performed by: FAMILY MEDICINE

## 2020-09-09 PROCEDURE — 90750 HZV VACC RECOMBINANT IM: CPT | Performed by: FAMILY MEDICINE

## 2020-09-09 PROCEDURE — 90471 IMMUNIZATION ADMIN: CPT | Performed by: FAMILY MEDICINE

## 2020-09-09 PROCEDURE — 99396 PREV VISIT EST AGE 40-64: CPT | Mod: 25 | Performed by: FAMILY MEDICINE

## 2020-09-09 PROCEDURE — 36415 COLL VENOUS BLD VENIPUNCTURE: CPT | Performed by: FAMILY MEDICINE

## 2020-09-09 RX ORDER — LOSARTAN POTASSIUM 100 MG/1
100 TABLET ORAL DAILY
Qty: 90 TABLET | Refills: 3 | Status: SHIPPED | OUTPATIENT
Start: 2020-09-09 | End: 2021-08-22

## 2020-09-09 RX ORDER — LOSARTAN POTASSIUM 100 MG/1
100 TABLET ORAL DAILY
Qty: 7 TABLET | Refills: 0 | Status: SHIPPED | OUTPATIENT
Start: 2020-09-09 | End: 2021-10-28

## 2020-09-09 RX ORDER — BUPROPION HYDROCHLORIDE 150 MG/1
150 TABLET, EXTENDED RELEASE ORAL 2 TIMES DAILY
Qty: 60 TABLET | Refills: 1 | Status: SHIPPED | OUTPATIENT
Start: 2020-09-09

## 2020-09-09 ASSESSMENT — PAIN SCALES - GENERAL: PAINLEVEL: NO PAIN (0)

## 2020-09-09 NOTE — PROGRESS NOTES
3  SUBJECTIVE:   CC: Anthony De La Torre is an 54 year old male who presents for preventive health visit.     Annual Exam:  Frequency of exercise:: 2-3 days/week  Getting at least 3 servings of Calcium per day:: Yes  Diet:: Regular (no restrictions)  Taking medications regularly:: Yes  Medication side effects:: Not applicable  Bi-annual eye exam:: Yes  Dental care twice a year:: Yes  Sleep apnea or symptoms of sleep apnea:: None  Duration of exercise:: 15-30 minutes    Patient informed that anything we discuss that is not related to preventative medicine, may be billed for; patient verbalizes understanding.      Today's PHQ-2 Score:   PHQ-2 ( 1999 [employer]) 9/9/2020 9/8/2020   Q1: Little interest or pleasure in doing things 0 -   Q2: Feeling down, depressed or hopeless 0 -   PHQ-2 Score 0 -   PHQ-2 Score - Incomplete       Abuse: Current or Past(Physical, Sexual or Emotional)- No  Do you feel safe in your environment? Yes    Have you ever done Advance Care Planning? (For example, a Health Directive, POLST, or a discussion with a medical provider or your loved ones about your wishes): No, advance care planning information given to patient to review.  Patient declined advance care planning discussion at this time.    Social History     Tobacco Use     Smoking status: Current Every Day Smoker     Packs/day: 1.00     Years: 20.00     Pack years: 20.00     Types: Cigarettes     Smokeless tobacco: Never Used     Tobacco comment: 1.00 ppd or less   Substance Use Topics     Alcohol use: Yes     Comment: Occasional - 6-7 drinks per week     If you drink alcohol do you typically have >3 drinks per day or >7 drinks per week? No                      Last PSA:   PSA   Date Value Ref Range Status   06/19/2019 0.38 0 - 4 ug/L Final     Comment:     Assay Method:  Chemiluminescence using Siemens Vista analyzer       Reviewed orders with patient. Reviewed health maintenance and updated orders accordingly - Yes  BP Readings from Last 3  Encounters:   09/09/20 (!) 135/96   07/29/20 (!) 154/94   07/02/20 123/79    Wt Readings from Last 3 Encounters:   09/09/20 77.7 kg (171 lb 3.2 oz)   07/29/20 78 kg (172 lb)   07/02/20 77.1 kg (170 lb)             Patient Active Problem List   Diagnosis     Plantar fascial fibromatosis     Achilles tendonitis     Fatigue     HTN (hypertension)     Intermittent asthma     Sensorineural hearing loss, asymmetrical     Hyperlipidemia with target LDL less than 130     Tinnitus of both ears     Cervicalgia     Mild intermittent asthma without complication     Essential hypertension with goal blood pressure less than 140/90     Colon polyps-tubular adenoma     Sacroiliitis (H)     Lumbar strain, initial encounter     Past Surgical History:   Procedure Laterality Date     HERNIA REPAIR      Right     PE TUBES  4/05/2010    BMT     SURGICAL HISTORY OF -       Right ankle     VASECTOMY  [id number]    Dr Concepcion       Social History     Tobacco Use     Smoking status: Current Every Day Smoker     Packs/day: 1.00     Years: 20.00     Pack years: 20.00     Types: Cigarettes     Smokeless tobacco: Never Used     Tobacco comment: 1.00 ppd or less   Substance Use Topics     Alcohol use: Yes     Comment: Occasional - 6-7 drinks per week     Family History   Problem Relation Age of Onset     Lipids Father      Hypertension Father      Musculoskeletal Disorder Father         Epilepsy     Diabetes Paternal Grandmother      Cancer - colorectal Paternal Grandfather      C.A.D. No family hx of      Cerebrovascular Disease No family hx of      Breast Cancer No family hx of      Prostate Cancer No family hx of          Current Outpatient Medications   Medication Sig Dispense Refill     amLODIPine (NORVASC) 2.5 MG tablet TAKE 1 TABLET DAILY 90 tablet 3     ascorbic acid (VITAMIN C) 1000 MG TABS Take 1,000 mg by mouth daily.       aspirin 81 MG chewable tablet Take 81 mg by mouth daily.       Bioflavonoid Products (BIOFLEX PO) Take 1-2  tablets by mouth daily.       cetirizine HCl 10 MG CAPS Take 25 mg by mouth daily as needed       cyclobenzaprine (FLEXERIL) 10 MG tablet Take 1 tablet (10 mg) by mouth 3 times daily as needed for muscle spasms 20 tablet 0     fish oil-omega-3 fatty acids (FISH OIL) 1000 MG capsule Take 2 g by mouth daily.       fluticasone (FLONASE) 50 MCG/ACT nasal spray        hydrochlorothiazide (HYDRODIURIL) 25 MG tablet Take 1 tablet (25 mg) by mouth daily 90 tablet 3     hydrochlorothiazide (HYDRODIURIL) 25 MG tablet TAKE 1 TABLET DAILY 90 tablet 0     losartan (COZAAR) 100 MG tablet TAKE 1 TABLET DAILY 90 tablet 3     rosuvastatin (CRESTOR) 10 MG tablet Take 0.5 tablets (5 mg) by mouth daily 45 tablet 3     sildenafil (REVATIO) 20 MG tablet 2-4 tabs prior to sex 90 tablet 3     albuterol (PROAIR HFA/PROVENTIL HFA/VENTOLIN HFA) 108 (90 BASE) MCG/ACT Inhaler Inhale 2 puffs into the lungs every 6 hours as needed for shortness of breath / dyspnea (Patient not taking: Reported on 8/6/2019) 1 Inhaler 6     Allergies   Allergen Reactions     Nkda [No Known Drug Allergies]      Seasonal Allergies        Reviewed and updated as needed this visit by clinical staff  Tobacco  Allergies  Meds  Med Hx  Surg Hx  Fam Hx  Soc Hx        Reviewed and updated as needed this visit by Provider          ROS:  CONSTITUTIONAL: NEGATIVE for fever, chills, change in weight  INTEGUMENTARY/SKIN: NEGATIVE for worrisome rashes, moles or lesions  EYES: NEGATIVE for vision changes or irritation  ENT: NEGATIVE for ear, mouth and throat problems  RESP: NEGATIVE for significant cough or SOB  CV: NEGATIVE for chest pain, palpitations or peripheral edema  GI: NEGATIVE for nausea, abdominal pain, heartburn, or change in bowel habits   male: negative for dysuria, hematuria, decreased urinary stream, erectile dysfunction, urethral discharge  MUSCULOSKELETAL: NEGATIVE for significant arthralgias or myalgia  NEURO: NEGATIVE for weakness, dizziness or  "paresthesias  PSYCHIATRIC: NEGATIVE for changes in mood or affect    OBJECTIVE:   BP (!) 135/96   Pulse 86   Temp 97.7  F (36.5  C) (Tympanic)   Wt 77.7 kg (171 lb 3.2 oz)   BMI 26.81 kg/m    EXAM:  GENERAL: healthy, alert and no distress  NECK: no adenopathy, no asymmetry, masses, or scars and thyroid normal to palpation  RESP: lungs clear to auscultation - no rales, rhonchi or wheezes  CV: regular rate and rhythm, normal S1 S2, no S3 or S4, no murmur, click or rub, no peripheral edema and peripheral pulses strong  ABDOMEN: soft, nontender, no hepatosplenomegaly, no masses and bowel sounds normal  MS: no gross musculoskeletal defects noted, no edema    Diagnostic Test Results:  Labs reviewed in Epic    ASSESSMENT/PLAN:   1. Routine general medical examination at a health care facility    2. Encounter for routine adult medical exam with abnormal findings    3. Essential hypertension with goal blood pressure less than 140/90  Good control.  Continue currant medications, continue to monito   - losartan (COZAAR) 100 MG tablet; Take 1 tablet (100 mg) by mouth daily  Dispense: 90 tablet; Refill: 3  - Basic metabolic panel    4. Hyperlipidemia with target LDL less than 130  Good control.  Continue currant medications, continue to monito   - Lipid panel reflex to direct LDL Fasting    COUNSELING:  Reviewed preventive health counseling, as reflected in patient instructions       Healthy diet/nutrition       Vision screening       Hearing screening    Estimated body mass index is 26.81 kg/m  as calculated from the following:    Height as of 7/29/20: 1.702 m (5' 7\").    Weight as of this encounter: 77.7 kg (171 lb 3.2 oz).    Weight management plan: Discussed healthy diet and exercise guidelines    He reports that he has been smoking cigarettes. He has a 20.00 pack-year smoking history. He has never used smokeless tobacco.  Tobacco Cessation Action Plan:   Information offered: Patient not interested at this " time    Counseling Resources:  ATP IV Guidelines  Pooled Cohorts Equation Calculator  FRAX Risk Assessment  ICSI Preventive Guidelines  Dietary Guidelines for Americans, 2010  Pinguo's MyPlate  ASA Prophylaxis  Lung CA Screening    Stanley Leroy MD  [facility]

## 2020-09-10 RX ORDER — LOSARTAN POTASSIUM 100 MG/1
100 TABLET ORAL DAILY
Qty: 90 TABLET | Refills: 3 | Status: SHIPPED | OUTPATIENT
Start: 2020-09-10 | End: 2021-10-28

## 2020-11-22 ENCOUNTER — MYC REFILL (OUTPATIENT)
Dept: FAMILY MEDICINE | Facility: CLINIC | Age: 55
End: 2020-11-22

## 2020-11-22 ENCOUNTER — HEALTH MAINTENANCE LETTER (OUTPATIENT)
Age: 55
End: 2020-11-22

## 2020-11-22 DIAGNOSIS — E78.5 HYPERLIPIDEMIA WITH TARGET LDL LESS THAN 130: ICD-10-CM

## 2020-11-22 RX ORDER — ROSUVASTATIN CALCIUM 10 MG/1
5 TABLET, COATED ORAL DAILY
Qty: 45 TABLET | Refills: 2 | Status: SHIPPED | OUTPATIENT
Start: 2020-11-22

## 2020-11-23 NOTE — TELEPHONE ENCOUNTER
Prescription approved per [facility] Refill Protocol.  Cecil Burgess RN

## 2021-03-25 ENCOUNTER — IMMUNIZATION (OUTPATIENT)
Dept: PEDIATRICS | Facility: CLINIC | Age: 56
End: 2021-03-25
Payer: COMMERCIAL

## 2021-03-25 PROCEDURE — 0001A PR COVID VAC PFIZER DIL RECON 30 MCG/0.3 ML IM: CPT

## 2021-03-25 PROCEDURE — 91300 PR COVID VAC PFIZER DIL RECON 30 MCG/0.3 ML IM: CPT

## 2021-04-15 ENCOUNTER — IMMUNIZATION (OUTPATIENT)
Dept: PEDIATRICS | Facility: CLINIC | Age: 56
End: 2021-04-15
Attending: INTERNAL MEDICINE
Payer: COMMERCIAL

## 2021-04-15 PROCEDURE — 91300 PR COVID VAC PFIZER DIL RECON 30 MCG/0.3 ML IM: CPT

## 2021-04-15 PROCEDURE — 0002A PR COVID VAC PFIZER DIL RECON 30 MCG/0.3 ML IM: CPT

## 2021-07-25 DIAGNOSIS — I10 ESSENTIAL HYPERTENSION WITH GOAL BLOOD PRESSURE LESS THAN 140/90: ICD-10-CM

## 2021-07-26 RX ORDER — HYDROCHLOROTHIAZIDE 25 MG/1
TABLET ORAL
Qty: 90 TABLET | Refills: 0 | Status: SHIPPED | OUTPATIENT
Start: 2021-07-26 | End: 2021-10-22

## 2021-08-02 DIAGNOSIS — I10 BENIGN ESSENTIAL HYPERTENSION: ICD-10-CM

## 2021-08-02 RX ORDER — AMLODIPINE BESYLATE 2.5 MG/1
TABLET ORAL
Qty: 90 TABLET | Refills: 0 | Status: SHIPPED | OUTPATIENT
Start: 2021-08-02 | End: 2021-10-28

## 2021-08-18 DIAGNOSIS — I10 ESSENTIAL HYPERTENSION WITH GOAL BLOOD PRESSURE LESS THAN 140/90: ICD-10-CM

## 2021-08-22 RX ORDER — LOSARTAN POTASSIUM 100 MG/1
TABLET ORAL
Qty: 90 TABLET | Refills: 0 | Status: SHIPPED | OUTPATIENT
Start: 2021-08-22 | End: 2021-10-28

## 2021-09-19 ENCOUNTER — HEALTH MAINTENANCE LETTER (OUTPATIENT)
Age: 56
End: 2021-09-19

## 2021-10-20 DIAGNOSIS — E78.5 HYPERLIPIDEMIA WITH TARGET LDL LESS THAN 130: Primary | ICD-10-CM

## 2021-10-20 DIAGNOSIS — I10 ESSENTIAL HYPERTENSION WITH GOAL BLOOD PRESSURE LESS THAN 140/90: ICD-10-CM

## 2021-10-21 NOTE — TELEPHONE ENCOUNTER
Routing refill request to provider for review/approval because:  Labs not current:  BMP > year - future orders placed per HM  Last recorded blood pressure > year  Due for annual exam - appointment scheduled for 10/28/2021    Sher Marinelli RN

## 2021-10-22 RX ORDER — HYDROCHLOROTHIAZIDE 25 MG/1
TABLET ORAL
Qty: 90 TABLET | Refills: 3 | Status: SHIPPED | OUTPATIENT
Start: 2021-10-22 | End: 2021-10-28

## 2021-10-27 ASSESSMENT — ENCOUNTER SYMPTOMS
CHILLS: 0
PALPITATIONS: 0
SHORTNESS OF BREATH: 0
DYSURIA: 0
NAUSEA: 0
ABDOMINAL PAIN: 0
PARESTHESIAS: 0
NERVOUS/ANXIOUS: 0
EYE PAIN: 0
CONSTIPATION: 0
HEARTBURN: 0
COUGH: 1
JOINT SWELLING: 1
HEADACHES: 0
HEMATURIA: 0
DIZZINESS: 0
SORE THROAT: 0
DIARRHEA: 0
WEAKNESS: 0
HEMATOCHEZIA: 0
MYALGIAS: 1
FREQUENCY: 0
ARTHRALGIAS: 1
FEVER: 0

## 2021-10-28 ENCOUNTER — OFFICE VISIT (OUTPATIENT)
Dept: FAMILY MEDICINE | Facility: CLINIC | Age: 56
End: 2021-10-28
Payer: COMMERCIAL

## 2021-10-28 VITALS
DIASTOLIC BLOOD PRESSURE: 78 MMHG | BODY MASS INDEX: 27.12 KG/M2 | HEIGHT: 67 IN | HEART RATE: 77 BPM | WEIGHT: 172.8 LBS | TEMPERATURE: 98.2 F | SYSTOLIC BLOOD PRESSURE: 124 MMHG | RESPIRATION RATE: 16 BRPM | OXYGEN SATURATION: 98 %

## 2021-10-28 DIAGNOSIS — C41.9 MALIGNANT NEOPLASM OF BONE AND ARTICULAR CARTILAGE (H): ICD-10-CM

## 2021-10-28 DIAGNOSIS — I10 PRIMARY HYPERTENSION: Primary | ICD-10-CM

## 2021-10-28 DIAGNOSIS — I10 BENIGN ESSENTIAL HYPERTENSION: ICD-10-CM

## 2021-10-28 DIAGNOSIS — M19.90 ARTHRITIS: ICD-10-CM

## 2021-10-28 DIAGNOSIS — Z12.5 SCREENING FOR PROSTATE CANCER: ICD-10-CM

## 2021-10-28 DIAGNOSIS — Z13.6 CARDIOVASCULAR SCREENING; LDL GOAL LESS THAN 130: ICD-10-CM

## 2021-10-28 DIAGNOSIS — N52.02 CORPORO-VENOUS OCCLUSIVE ERECTILE DYSFUNCTION: ICD-10-CM

## 2021-10-28 DIAGNOSIS — I10 ESSENTIAL HYPERTENSION WITH GOAL BLOOD PRESSURE LESS THAN 140/90: ICD-10-CM

## 2021-10-28 LAB
ANION GAP SERPL CALCULATED.3IONS-SCNC: 3 MMOL/L (ref 3–14)
BUN SERPL-MCNC: 22 MG/DL (ref 7–30)
CALCIUM SERPL-MCNC: 9.5 MG/DL (ref 8.5–10.1)
CHLORIDE BLD-SCNC: 106 MMOL/L (ref 94–109)
CHOLEST SERPL-MCNC: 220 MG/DL
CO2 SERPL-SCNC: 29 MMOL/L (ref 20–32)
CREAT SERPL-MCNC: 0.64 MG/DL (ref 0.66–1.25)
FASTING STATUS PATIENT QL REPORTED: YES
GFR SERPL CREATININE-BSD FRML MDRD: >90 ML/MIN/1.73M2
GLUCOSE BLD-MCNC: 112 MG/DL (ref 70–99)
HDLC SERPL-MCNC: 46 MG/DL
LDLC SERPL CALC-MCNC: 138 MG/DL
NONHDLC SERPL-MCNC: 174 MG/DL
POTASSIUM BLD-SCNC: 3.9 MMOL/L (ref 3.4–5.3)
PSA SERPL-MCNC: 0.45 UG/L (ref 0–4)
SODIUM SERPL-SCNC: 138 MMOL/L (ref 133–144)
TRIGL SERPL-MCNC: 178 MG/DL
URATE SERPL-MCNC: 5.3 MG/DL (ref 3.5–7.2)

## 2021-10-28 PROCEDURE — 99396 PREV VISIT EST AGE 40-64: CPT | Performed by: FAMILY MEDICINE

## 2021-10-28 PROCEDURE — 36415 COLL VENOUS BLD VENIPUNCTURE: CPT | Performed by: FAMILY MEDICINE

## 2021-10-28 PROCEDURE — 80061 LIPID PANEL: CPT | Performed by: FAMILY MEDICINE

## 2021-10-28 PROCEDURE — G0103 PSA SCREENING: HCPCS | Performed by: FAMILY MEDICINE

## 2021-10-28 PROCEDURE — 84550 ASSAY OF BLOOD/URIC ACID: CPT | Performed by: FAMILY MEDICINE

## 2021-10-28 PROCEDURE — 80048 BASIC METABOLIC PNL TOTAL CA: CPT | Performed by: FAMILY MEDICINE

## 2021-10-28 RX ORDER — LOSARTAN POTASSIUM 100 MG/1
100 TABLET ORAL DAILY
Qty: 90 TABLET | Refills: 3 | Status: SHIPPED | OUTPATIENT
Start: 2021-10-28 | End: 2021-11-15

## 2021-10-28 RX ORDER — HYDROCHLOROTHIAZIDE 25 MG/1
25 TABLET ORAL DAILY
Qty: 90 TABLET | Refills: 3 | Status: SHIPPED | OUTPATIENT
Start: 2021-10-28 | End: 2021-11-15

## 2021-10-28 RX ORDER — AMLODIPINE BESYLATE 2.5 MG/1
2.5 TABLET ORAL DAILY
Qty: 90 TABLET | Refills: 3 | Status: SHIPPED | OUTPATIENT
Start: 2021-10-28 | End: 2021-11-15

## 2021-10-28 RX ORDER — SILDENAFIL CITRATE 20 MG/1
TABLET ORAL
Qty: 90 TABLET | Refills: 3 | Status: SHIPPED | OUTPATIENT
Start: 2021-10-28 | End: 2021-11-15

## 2021-10-28 ASSESSMENT — ENCOUNTER SYMPTOMS
FREQUENCY: 0
FEVER: 0
NAUSEA: 0
WEAKNESS: 0
JOINT SWELLING: 1
ARTHRALGIAS: 1
HEMATOCHEZIA: 0
EYE PAIN: 0
NERVOUS/ANXIOUS: 0
HEMATURIA: 0
SORE THROAT: 0
HEADACHES: 0
DIZZINESS: 0
DIARRHEA: 0
ABDOMINAL PAIN: 0
CHILLS: 0
PARESTHESIAS: 0
SHORTNESS OF BREATH: 0
PALPITATIONS: 0
HEARTBURN: 0
MYALGIAS: 1
DYSURIA: 0
COUGH: 1
CONSTIPATION: 0

## 2021-10-28 ASSESSMENT — MIFFLIN-ST. JEOR: SCORE: 1563.19
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
## 2021-10-28 NOTE — PROGRESS NOTES
Has deQuervain on right wrist    Bunion on left foot     Has nodule on thumb      Answers for HPI/ROS submitted by the patient on 10/27/2021  Frequency of exercise:: 2-3 days/week  Getting at least 3 servings of Calcium per day:: Yes  Diet:: Regular (no restrictions), Low salt  Taking medications regularly:: Yes  Medication side effects:: Muscle aches  Bi-annual eye exam:: Yes  Dental care twice a year:: Yes  Sleep apnea or symptoms of sleep apnea:: Daytime drowsiness  abdominal pain: No  Blood in stool: No  Blood in urine: No  chest pain: No  chills: No  congestion: Yes  constipation: No  cough: Yes  diarrhea: No  dizziness: No  ear pain: No  eye pain: No  nervous/anxious: No  fever: No  frequency: No  genital sores: No  headaches: No  hearing loss: Yes  heartburn: No  arthralgias: Yes  joint swelling: Yes  peripheral edema: No  mood changes: No  myalgias: Yes  nausea: No  dysuria: No  palpitations: No  Skin sensation changes: No  sore throat: No  urgency: No  rash: No  shortness of breath: No  visual disturbance: No  weakness: No  impotence: No  penile discharge: No  Additional concerns today:: No  Duration of exercise:: 15-30 minutes

## 2021-10-29 ASSESSMENT — ASTHMA QUESTIONNAIRES: ACT_TOTALSCORE: 25

## 2021-11-01 DIAGNOSIS — I10 BENIGN ESSENTIAL HYPERTENSION: ICD-10-CM

## 2021-11-01 RX ORDER — AMLODIPINE BESYLATE 2.5 MG/1
TABLET ORAL
Qty: 90 TABLET | Refills: 3 | OUTPATIENT
Start: 2021-11-01

## 2021-11-14 ENCOUNTER — MYC MEDICAL ADVICE (OUTPATIENT)
Dept: FAMILY MEDICINE | Facility: CLINIC | Age: 56
End: 2021-11-14
Payer: COMMERCIAL

## 2021-11-14 DIAGNOSIS — I10 BENIGN ESSENTIAL HYPERTENSION: ICD-10-CM

## 2021-11-14 DIAGNOSIS — N52.02 CORPORO-VENOUS OCCLUSIVE ERECTILE DYSFUNCTION: ICD-10-CM

## 2021-11-14 DIAGNOSIS — I10 ESSENTIAL HYPERTENSION WITH GOAL BLOOD PRESSURE LESS THAN 140/90: ICD-10-CM

## 2021-11-15 RX ORDER — LOSARTAN POTASSIUM 100 MG/1
100 TABLET ORAL DAILY
Qty: 90 TABLET | Refills: 3 | Status: SHIPPED | OUTPATIENT
Start: 2021-11-15 | End: 2022-08-22

## 2021-11-15 RX ORDER — AMLODIPINE BESYLATE 2.5 MG/1
2.5 TABLET ORAL DAILY
Qty: 90 TABLET | Refills: 3 | Status: SHIPPED | OUTPATIENT
Start: 2021-11-15 | End: 2022-08-22

## 2021-11-15 RX ORDER — HYDROCHLOROTHIAZIDE 25 MG/1
25 TABLET ORAL DAILY
Qty: 90 TABLET | Refills: 3 | Status: SHIPPED | OUTPATIENT
Start: 2021-11-15 | End: 2022-08-22

## 2021-11-15 RX ORDER — SILDENAFIL CITRATE 20 MG/1
TABLET ORAL
Qty: 90 TABLET | Refills: 3 | Status: SHIPPED | OUTPATIENT
Start: 2021-11-15 | End: 2022-01-20

## 2021-11-16 ENCOUNTER — TELEPHONE (OUTPATIENT)
Dept: FAMILY MEDICINE | Facility: CLINIC | Age: 56
End: 2021-11-16
Payer: COMMERCIAL

## 2021-11-16 NOTE — TELEPHONE ENCOUNTER
Prior Authorization Retail Medication Request    Medication/Dose:   ICD code (if different than what is on RX):  Corporo-venous occlusive erectile dysfunction [N52.02]   Previously Tried and Failed:    Rationale:      Insurance Name:  [employer]  Covermymeds:  Key: [id number]  Last Name: Wil  : 1965      Pharmacy Information (if different than what is on RX)  Name:  [facility]  Phone:

## 2021-11-16 NOTE — TELEPHONE ENCOUNTER
Central Prior Authorization Team  Phone: [phone]    PA Initiation    Medication: Sildenafil  Insurance Company: [employer] - Phone [phone] Fax [phone]  Pharmacy Filling the Rx: [facility] - [address]  Filling Pharmacy Phone: [phone]  Filling Pharmacy Fax:    Start Date: 11/16/2021

## 2021-11-22 NOTE — TELEPHONE ENCOUNTER
PRIOR AUTHORIZATION DENIED    Medication: Sildenafil-DENIED    Denial Date: 11/17/2021    Denial Rational: MEDICATION IS EXCLUDED FROM COVERAGE WHEN USED FOR ED.        Appeal Information:  IF YOU WOULD LIKE TO APPEAL PLEASE SUPPLY PA TEAM WITH A LETTER OF MEDICAL NECESSITY WITH CLINICAL REASON.

## 2022-01-20 ENCOUNTER — MYC MEDICAL ADVICE (OUTPATIENT)
Dept: FAMILY MEDICINE | Facility: CLINIC | Age: 57
End: 2022-01-20
Payer: COMMERCIAL

## 2022-01-20 DIAGNOSIS — N52.02 CORPORO-VENOUS OCCLUSIVE ERECTILE DYSFUNCTION: ICD-10-CM

## 2022-01-20 RX ORDER — SILDENAFIL CITRATE 20 MG/1
TABLET ORAL
Qty: 90 TABLET | Refills: 3 | Status: SHIPPED | OUTPATIENT
Start: 2022-01-20 | End: 2024-01-04

## 2022-01-20 NOTE — TELEPHONE ENCOUNTER
Call placed to [employer]  Requested script for Sildenafil to be cancelled as patient requesting medication to go to another pharmacy    Pharmacy staff report the medication was never refilled on 11/2021 and was cancelled   Pharmacy staff state they will keep that refill cancelled     Medication sent to new requested pharmacy  Prescription approved per [facility] Refill Protocol.    Sher Marinelli RN

## 2022-02-23 ENCOUNTER — OFFICE VISIT (OUTPATIENT)
Dept: FAMILY MEDICINE | Facility: CLINIC | Age: 57
End: 2022-02-23
Payer: COMMERCIAL

## 2022-02-23 VITALS
SYSTOLIC BLOOD PRESSURE: 161 MMHG | TEMPERATURE: 98.2 F | BODY MASS INDEX: 27.65 KG/M2 | HEIGHT: 67 IN | HEART RATE: 78 BPM | RESPIRATION RATE: 16 BRPM | DIASTOLIC BLOOD PRESSURE: 97 MMHG | WEIGHT: 176.2 LBS

## 2022-02-23 DIAGNOSIS — M77.8 TENDONITIS OF WRIST, RIGHT: Primary | ICD-10-CM

## 2022-02-23 PROCEDURE — 99213 OFFICE O/P EST LOW 20 MIN: CPT | Performed by: FAMILY MEDICINE

## 2022-02-23 ASSESSMENT — PAIN SCALES - GENERAL: PAINLEVEL: MODERATE PAIN (4)

## 2022-02-23 NOTE — LETTER
[facility]  [id number]  [facility] [address]  Phone: [phone]      February 23, 2022      RE: Anthony De La Torre  [address] [facility] [address]        To whom it may concern:    Anthony GUZMAN Wil is under my professional care. Please limit Rolling to 4 hours every other day.   Please limit APM to 4 hours every other day, alternatin every other day with Rolling for 6 months.   Please try to maximize task rotations to avoid over use syndromes in the future.     Sincerely,        Dr. Stanley Leroy

## 2022-02-23 NOTE — PROGRESS NOTES
SUBJECTIVE:                                                    Anthony De La Torre is a 56 year old male who presents to clinic today for the following health issues:    Chief Complaint   Patient presents with     Work Comp     right wrist pain     Answers for HPI/ROS submitted by the patient on 2/23/2022  How many servings of fruits and vegetables do you eat daily?: 2-3  On average, how many sweetened beverages do you drink each day (Examples: soda, juice, sweet tea, etc.  Do NOT count diet or artificially sweetened beverages)?: 1  How many minutes a day do you exercise enough to make your heart beat faster?: 10 to 19  How many days a week do you exercise enough to make your heart beat faster?: 4  How many days per week do you miss taking your medication?: 0  What is the reason for your visit today?: Right wrist  When did your symptoms begin?: More than a month  How would you describe these symptoms?: Moderate  Are your symptoms:: Worsening  Have you had these symptoms before?: Yes  Have you tried or received treatment for these symptoms before?: No  Is there anything that makes you feel worse?: Working  Is there anything that makes you feel better?: Resting it      Problem list and histories reviewed & adjusted, as indicated.  Additional history:     Patient Active Problem List   Diagnosis     Plantar fascial fibromatosis     Achilles tendonitis     Fatigue     HTN (hypertension)     Intermittent asthma     Sensorineural hearing loss, asymmetrical     Hyperlipidemia with target LDL less than 130     Tinnitus of both ears     Cervicalgia     Mild intermittent asthma without complication     Essential hypertension with goal blood pressure less than 140/90     Colon polyps-tubular adenoma     Sacroiliitis (H)     Lumbar strain, initial encounter     Past Surgical History:   Procedure Laterality Date     HERNIA REPAIR      Right     PE TUBES  4/05/2010    BMT     SURGICAL HISTORY OF -       Right ankle     VASECTOMY   [id number]    Dr Concepcion       Social History     Tobacco Use     Smoking status: Current Every Day Smoker     Packs/day: 1.00     Years: 20.00     Pack years: 20.00     Types: Cigarettes     Smokeless tobacco: Never Used     Tobacco comment: 1.00 ppd or less   Substance Use Topics     Alcohol use: Yes     Comment: Occasional - 6-7 drinks per week     Family History   Problem Relation Age of Onset     Lipids Father      Hypertension Father      Musculoskeletal Disorder Father         Epilepsy     Diabetes Paternal Grandmother      Cancer - colorectal Paternal Grandfather      C.A.D. No family hx of      Cerebrovascular Disease No family hx of      Breast Cancer No family hx of      Prostate Cancer No family hx of          Current Outpatient Medications   Medication Sig Dispense Refill     amLODIPine (NORVASC) 2.5 MG tablet Take 1 tablet (2.5 mg) by mouth daily 90 tablet 3     ascorbic acid (VITAMIN C) 1000 MG TABS Take 1,000 mg by mouth daily.       aspirin 81 MG chewable tablet Take 81 mg by mouth daily.       Bioflavonoid Products (BIOFLEX PO) Take 1-2 tablets by mouth daily.       buPROPion (WELLBUTRIN SR) 150 MG 12 hr tablet Take 1 tablet (150 mg) by mouth 2 times daily 60 tablet 1     cetirizine HCl 10 MG CAPS Take 25 mg by mouth daily as needed       fish oil-omega-3 fatty acids (FISH OIL) 1000 MG capsule Take 2 g by mouth daily.       fluticasone (FLONASE) 50 MCG/ACT nasal spray        hydrochlorothiazide (HYDRODIURIL) 25 MG tablet Take 1 tablet (25 mg) by mouth daily 90 tablet 3     losartan (COZAAR) 100 MG tablet Take 1 tablet (100 mg) by mouth daily 90 tablet 3     rosuvastatin (CRESTOR) 10 MG tablet Take 0.5 tablets (5 mg) by mouth daily 45 tablet 2     sildenafil (REVATIO) 20 MG tablet 2-4 tabs prior to sex 90 tablet 3     albuterol (PROAIR HFA/PROVENTIL HFA/VENTOLIN HFA) 108 (90 BASE) MCG/ACT Inhaler Inhale 2 puffs into the lungs every 6 hours as needed for shortness of breath / dyspnea (Patient not  "taking: Reported on 2/23/2022) 1 Inhaler 6     cyclobenzaprine (FLEXERIL) 10 MG tablet Take 1 tablet (10 mg) by mouth 3 times daily as needed for muscle spasms (Patient not taking: Reported on 2/23/2022) 20 tablet 0     Allergies   Allergen Reactions     Nkda [No Known Drug Allergies]      Seasonal Allergies        ROS:  Constitutional, HEENT, cardiovascular, pulmonary, gi and gu systems are negative, except as otherwise noted.    OBJECTIVE:                                                    BP (!) 161/97   Pulse 78   Temp 98.2  F (36.8  C) (Tympanic)   Resp 16   Ht 1.695 m (5' 6.73\")   Wt 79.9 kg (176 lb 3.2 oz)   BMI 27.82 kg/m   Body mass index is 27.82 kg/m .     GENERAL: healthy, alert, well nourished, well hydrated, no distress  HENT: ear canals- normal; TMs- normal; Nose- normal; Mouth- no ulcers, no lesions  NECK: no tenderness, no adenopathy, no asymmetry, no masses, no stiffness; thyroid- normal to palpation  RESP: lungs clear to auscultation - no rales, no rhonchi, no wheezes  CV: regular rates and rhythm, normal S1 S2, no S3 or S4 and no murmur, no click or rub -  ABDOMEN: soft, no tenderness, no  hepatosplenomegaly, no masses, normal bowel sounds  Wrist:  Tenderness/swelling flexor tendong forarm      ASSESSMENT/PLAN:                                                      (M77.8) Tendonitis of wrist, right  (primary encounter diagnosis)  Improved.  Needs to avoid repaptivce motions that wioll reagrivate it  His work has kindly providedd me with his job discriptions.  I suggest the above restritction in addition to rest, ice, compresion, elevation, nsaids     Please limit Rolling to 4 hours every other day.   Please limit APM to 4 hours every other day, alternatin every other day with Rolling for 6 months.   Please try to maximize task rotations to avoid over use syndromes in the future.      reports that he has been smoking cigarettes. He has a 20.00 pack-year smoking history. He has never used " smokeless tobacco.      Weight management plan: Discussed healthy diet and exercise guidelines    [facility]

## 2022-03-30 ENCOUNTER — OFFICE VISIT (OUTPATIENT)
Dept: FAMILY MEDICINE | Facility: CLINIC | Age: 57
End: 2022-03-30
Payer: COMMERCIAL

## 2022-03-30 VITALS
OXYGEN SATURATION: 99 % | WEIGHT: 175 LBS | HEART RATE: 81 BPM | DIASTOLIC BLOOD PRESSURE: 90 MMHG | BODY MASS INDEX: 27.47 KG/M2 | RESPIRATION RATE: 14 BRPM | TEMPERATURE: 97.5 F | HEIGHT: 67 IN | SYSTOLIC BLOOD PRESSURE: 160 MMHG

## 2022-03-30 DIAGNOSIS — L03.211 CELLULITIS OF FACE: ICD-10-CM

## 2022-03-30 DIAGNOSIS — Z12.11 SCREEN FOR COLON CANCER: Primary | ICD-10-CM

## 2022-03-30 PROCEDURE — 99213 OFFICE O/P EST LOW 20 MIN: CPT | Performed by: FAMILY MEDICINE

## 2022-03-30 RX ORDER — CEPHALEXIN 500 MG/1
500 CAPSULE ORAL 3 TIMES DAILY
Qty: 21 CAPSULE | Refills: 0 | Status: SHIPPED | OUTPATIENT
Start: 2022-03-30 | End: 2023-06-06

## 2022-03-30 ASSESSMENT — ASTHMA QUESTIONNAIRES: ACT_TOTALSCORE: 25

## 2022-03-30 ASSESSMENT — PAIN SCALES - GENERAL: PAINLEVEL: MILD PAIN (2)

## 2022-03-30 NOTE — PROGRESS NOTES
"  Assessment & Plan     Screen for colon cancer      Cellulitis of face    - cephALEXin (KEFLEX) 500 MG capsule; Take 1 capsule (500 mg) by mouth 3 times daily       Stanley Leroy MD  [facility] ISA Jones is a 56 year old who presents for the following health issues    History of Present Illness       Reason for visit:  My right side of my face is swollen  Symptom onset:  Today  Symptoms include:  Face swollen  Symptom intensity:  Moderate  Symptom progression:  Improving  Had these symptoms before:  No  What makes it worse:  No  What makes it better:  No    He eats 2-3 servings of fruits and vegetables daily.He consumes 1 sweetened beverage(s) daily.He exercises with enough effort to increase his heart rate 20 to 29 minutes per day.  He exercises with enough effort to increase his heart rate 4 days per week.   He is taking medications regularly.       Facial Swelling   Onset: Started over night, woke up with swelling started through out the night   Description: Ingrown hair on right side of face, swelling is present.  Intensity: mild  Progression of Symptoms:  improving  Accompanying Signs & Symptoms: no   Previous history of similar problem: no   Precipitating factors:        Worsened by: not sure   Alleviating factors:        Improved by: Pt tried popping it and though it helped   Therapies tried and outcome: Aspirin       Review of Systems   Constitutional, HEENT, cardiovascular, pulmonary, gi and gu systems are negative, except as otherwise noted.      Objective    BP (!) 160/90   Pulse 81   Temp 97.5  F (36.4  C) (Tympanic)   Resp 14   Ht 1.709 m (5' 7.3\")   Wt 79.4 kg (175 lb)   SpO2 99%   BMI 27.17 kg/m    Body mass index is 27.17 kg/m .  Physical Exam   GENERAL: healthy, alert and no distress  NECK: no adenopathy, no asymmetry, masses, or scars and thyroid normal to palpation  RESP: lungs clear to auscultation - no rales, rhonchi or wheezes  CV: regular rate and " rhythm, normal S1 S2, no S3 or S4, no murmur, click or rub, no peripheral edema and peripheral pulses strong  ABDOMEN: soft, nontender, no hepatosplenomegaly, no masses and bowel sounds normal  MS: no gross musculoskeletal defects noted, no edema  SKIN:  Has red war tend erythems lefc face

## 2022-04-13 ENCOUNTER — OFFICE VISIT (OUTPATIENT)
Dept: FAMILY MEDICINE | Facility: CLINIC | Age: 57
End: 2022-04-13
Payer: COMMERCIAL

## 2022-04-13 VITALS
HEIGHT: 67 IN | HEART RATE: 88 BPM | OXYGEN SATURATION: 99 % | SYSTOLIC BLOOD PRESSURE: 150 MMHG | TEMPERATURE: 97.9 F | DIASTOLIC BLOOD PRESSURE: 88 MMHG | WEIGHT: 176 LBS | BODY MASS INDEX: 27.62 KG/M2

## 2022-04-13 DIAGNOSIS — M77.8 TENDINITIS OF RIGHT WRIST: Primary | ICD-10-CM

## 2022-04-13 PROCEDURE — 99213 OFFICE O/P EST LOW 20 MIN: CPT | Performed by: FAMILY MEDICINE

## 2022-04-13 ASSESSMENT — PAIN SCALES - GENERAL: PAINLEVEL: NO PAIN (0)

## 2022-04-13 NOTE — PROGRESS NOTES
"  Assessment & Plan     Tendinitis of right wrist -   Moderately improved.   encounter diagnosis)  Improved.  Needs to avoid repaptivce motions that will reagrivate it  His work has kindly providedd me with his job discriptions.  I suggest the above restritction in addition to rest, ice, compresion, elevation, nsaids      Please limit Rolling to 4 hours every other day.   Please limit APM to 4 hours every other day, alternatin every other day with Rolling for 6 months.   Please try to maximize task rotations to avoid over use syndromes in the future.     Stanley Leroy MD  [facility] ISA Jones is a 56 year old who presents for the following health issues    Wrist Injury    History of Present Illness       Reason for visit:  Recheck wrist  Symptom onset:  More than a month  Symptoms include:  Recheck  Symptom intensity:  Mild  Symptom progression:  Improving  Had these symptoms before:  Yes  Has tried/received treatment for these symptoms:  Yes  Previous treatment was successful:  No  What makes it worse:  No  What makes it better:  Less wrist use    He eats 2-3 servings of fruits and vegetables daily.He consumes 1 sweetened beverage(s) daily.He exercises with enough effort to increase his heart rate 20 to 29 minutes per day.  He exercises with enough effort to increase his heart rate 4 days per week.   He is taking medications regularly.     Review of Systems   Constitutional, HEENT, cardiovascular, pulmonary, gi and gu systems are negative, except as otherwise noted.      Objective    BP (!) 150/88   Pulse 88   Temp 97.9  F (36.6  C) (Tympanic)   Ht 1.709 m (5' 7.3\")   Wt 79.8 kg (176 lb)   SpO2 99%   BMI 27.32 kg/m    Body mass index is 27.32 kg/m .  Physical Exam   GENERAL: healthy, alert and no distress  NECK: no adenopathy, no asymmetry, masses, or scars and thyroid normal to palpation  RESP: lungs clear to auscultation - no rales, rhonchi or wheezes  CV: regular rate and " rhythm, normal S1 S2, no S3 or S4, no murmur, click or rub, no peripheral edema and peripheral pulses strong  ABDOMEN: soft, nontender, no hepatosplenomegaly, no masses and bowel sounds normal  MS: no gross musculoskeletal defects noted, no edema  Wrist:  Has less inflammation of flexor tendons on right wrist.  Full rom

## 2022-06-07 ENCOUNTER — TELEPHONE (OUTPATIENT)
Dept: FAMILY MEDICINE | Facility: CLINIC | Age: 57
End: 2022-06-07
Payer: COMMERCIAL

## 2022-06-07 DIAGNOSIS — Z12.11 SCREENING FOR COLON CANCER: Primary | ICD-10-CM

## 2022-06-07 NOTE — TELEPHONE ENCOUNTER
Patient Quality Outreach    Patient is due for the following:   Asthma  -  AAP  Hypertension -  BP check  Colon Cancer Screening -  Colonoscopy  Immunizations  -  Covid, Pneumococcal, Tdap and Zoster    NEXT STEPS:   Schedule a nurse only visit for blood pressure check    Type of outreach:    Phone, left message for patient/parent to call back.    Next Steps:  Reach out within 90 days via [employer].    Max number of attempts reached: No. Will try again in 90 days if patient still on fail list.    Questions for provider review:    None     Stella Real  Chart routed to Stella Real CMA.

## 2022-08-22 ENCOUNTER — TELEPHONE (OUTPATIENT)
Dept: FAMILY MEDICINE | Facility: CLINIC | Age: 57
End: 2022-08-22

## 2022-08-22 ENCOUNTER — OFFICE VISIT (OUTPATIENT)
Dept: FAMILY MEDICINE | Facility: CLINIC | Age: 57
End: 2022-08-22
Payer: COMMERCIAL

## 2022-08-22 VITALS
HEART RATE: 74 BPM | WEIGHT: 173.7 LBS | HEIGHT: 67 IN | TEMPERATURE: 97.5 F | DIASTOLIC BLOOD PRESSURE: 89 MMHG | SYSTOLIC BLOOD PRESSURE: 135 MMHG | OXYGEN SATURATION: 98 % | BODY MASS INDEX: 27.26 KG/M2 | RESPIRATION RATE: 18 BRPM

## 2022-08-22 DIAGNOSIS — M46.1 SACROILIITIS (H): ICD-10-CM

## 2022-08-22 DIAGNOSIS — I10 BENIGN ESSENTIAL HYPERTENSION: ICD-10-CM

## 2022-08-22 DIAGNOSIS — C41.9 MALIGNANT NEOPLASM OF BONE AND ARTICULAR CARTILAGE (H): ICD-10-CM

## 2022-08-22 DIAGNOSIS — I10 ESSENTIAL HYPERTENSION WITH GOAL BLOOD PRESSURE LESS THAN 140/90: ICD-10-CM

## 2022-08-22 DIAGNOSIS — M77.8 TENDONITIS OF WRIST, RIGHT: Primary | ICD-10-CM

## 2022-08-22 PROCEDURE — 99213 OFFICE O/P EST LOW 20 MIN: CPT | Performed by: FAMILY MEDICINE

## 2022-08-22 RX ORDER — LOSARTAN POTASSIUM 100 MG/1
100 TABLET ORAL DAILY
Qty: 90 TABLET | Refills: 3 | Status: SHIPPED | OUTPATIENT
Start: 2022-08-22 | End: 2022-11-11

## 2022-08-22 RX ORDER — AMLODIPINE BESYLATE 2.5 MG/1
2.5 TABLET ORAL DAILY
Qty: 90 TABLET | Refills: 3 | Status: SHIPPED | OUTPATIENT
Start: 2022-08-22 | End: 2022-10-26

## 2022-08-22 RX ORDER — HYDROCHLOROTHIAZIDE 25 MG/1
25 TABLET ORAL DAILY
Qty: 90 TABLET | Refills: 3 | Status: SHIPPED | OUTPATIENT
Start: 2022-08-22 | End: 2022-12-26

## 2022-08-22 ASSESSMENT — PAIN SCALES - GENERAL: PAINLEVEL: MILD PAIN (2)

## 2022-08-22 NOTE — PROGRESS NOTES
SUBJECTIVE:                                                    Anthony De La Torre is a 56 year old male who presents to clinic today for the following health issues:    Chief Complaint   Patient presents with     Follow Up     For work comp.     Answers for HPI/ROS submitted by the patient on 8/22/2022  What is the reason for your visit today? : Follow up for a wrist problem through work  How many servings of fruits and vegetables do you eat daily?: 2-3  On average, how many sweetened beverages do you drink each day (Examples: soda, juice, sweet tea, etc.  Do NOT count diet or artificially sweetened beverages)?: 1  How many minutes a day do you exercise enough to make your heart beat faster?: 20 to 29  How many days a week do you exercise enough to make your heart beat faster?: 3 or less  How many days per week do you miss taking your medication?: 0      Problem list and histories reviewed & adjusted, as indicated.  Additional history:     Patient Active Problem List   Diagnosis     Plantar fascial fibromatosis     Achilles tendonitis     Fatigue     HTN (hypertension)     Intermittent asthma     Sensorineural hearing loss, asymmetrical     Hyperlipidemia with target LDL less than 130     Tinnitus of both ears     Cervicalgia     Mild intermittent asthma without complication     Essential hypertension with goal blood pressure less than 140/90     Colon polyps-tubular adenoma     Sacroiliitis (H)     Lumbar strain, initial encounter     Past Surgical History:   Procedure Laterality Date     HERNIA REPAIR      Right     PE TUBES  4/05/2010    BMT     SURGICAL HISTORY OF -       Right ankle     VASECTOMY  [id number]    Dr Concepcion       Social History     Tobacco Use     Smoking status: Current Every Day Smoker     Packs/day: 1.00     Years: 20.00     Pack years: 20.00     Types: Cigarettes     Smokeless tobacco: Never Used     Tobacco comment: 1.00 ppd or less   Substance Use Topics     Alcohol use: Yes     Comment:  Occasional - 6-7 drinks per week     Family History   Problem Relation Age of Onset     Lipids Father      Hypertension Father      Musculoskeletal Disorder Father         Epilepsy     Diabetes Paternal Grandmother      Cancer - colorectal Paternal Grandfather      C.A.D. No family hx of      Cerebrovascular Disease No family hx of      Breast Cancer No family hx of      Prostate Cancer No family hx of          Current Outpatient Medications   Medication Sig Dispense Refill     albuterol (PROAIR HFA/PROVENTIL HFA/VENTOLIN HFA) 108 (90 BASE) MCG/ACT Inhaler Inhale 2 puffs into the lungs every 6 hours as needed for shortness of breath / dyspnea 1 Inhaler 6     amLODIPine (NORVASC) 2.5 MG tablet Take 1 tablet (2.5 mg) by mouth daily 90 tablet 3     ascorbic acid (VITAMIN C) 1000 MG TABS Take 1,000 mg by mouth daily.       aspirin 81 MG chewable tablet Take 81 mg by mouth daily.       Bioflavonoid Products (BIOFLEX PO) Take 1-2 tablets by mouth daily.       buPROPion (WELLBUTRIN SR) 150 MG 12 hr tablet Take 1 tablet (150 mg) by mouth 2 times daily 60 tablet 1     cetirizine HCl 10 MG CAPS Take 25 mg by mouth daily as needed       fish oil-omega-3 fatty acids 1000 MG capsule Take 2 g by mouth daily.       fluticasone (FLONASE) 50 MCG/ACT nasal spray        hydrochlorothiazide (HYDRODIURIL) 25 MG tablet Take 1 tablet (25 mg) by mouth daily 90 tablet 3     losartan (COZAAR) 100 MG tablet Take 1 tablet (100 mg) by mouth daily 90 tablet 3     sildenafil (REVATIO) 20 MG tablet 2-4 tabs prior to sex 90 tablet 3     cephALEXin (KEFLEX) 500 MG capsule Take 1 capsule (500 mg) by mouth 3 times daily (Patient not taking: Reported on 8/22/2022) 21 capsule 0     rosuvastatin (CRESTOR) 10 MG tablet Take 0.5 tablets (5 mg) by mouth daily (Patient not taking: Reported on 8/22/2022) 45 tablet 2     Allergies   Allergen Reactions     Nkda [No Known Drug Allergies]      Seasonal Allergies        ROS:  Constitutional, HEENT, cardiovascular,  "pulmonary, gi and gu systems are negative, except as otherwise noted.    OBJECTIVE:                                                    /89   Pulse 74   Temp 97.5  F (36.4  C) (Tympanic)   Resp 18   Ht 1.709 m (5' 7.3\")   Wt 78.8 kg (173 lb 11.2 oz)   SpO2 98%   BMI 26.96 kg/m   Body mass index is 26.96 kg/m .   GENERAL: healthy, alert, well nourished, well hydrated, no distress  HENT: ear canals- normal; TMs- normal; Nose- normal; Mouth- no ulcers, no lesions  NECK: no tenderness, no adenopathy, no asymmetry, no masses, no stiffness; thyroid- normal to palpation  RESP: lungs clear to auscultation - no rales, no rhonchi, no wheezes  CV: regular rates and rhythm, normal S1 S2, no S3 or S4 and no murmur, no click or rub -  ABDOMEN: soft, no tenderness, no  hepatosplenomegaly, no masses, normal bowel sounds       ASSESSMENT/PLAN:                                                      (M77.8) Tendonitis of wrist, right  Stable has done well due to restrictions  I recommend he continue with them indefinitely.        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  Is should be removed    (M46.1) Sacroiliitis (H)  I do not think this is a real diagnosis it has been in his chart before he was my patient.          reports that he has been smoking cigarettes. He has a 20.00 pack-year smoking history. He has never used smokeless tobacco.      Weight management plan: Discussed healthy diet and exercise guidelines      [facility]    "

## 2022-08-22 NOTE — LETTER
[facility]  [address]  [place] [address]  Phone: [phone]      August 22, 2022      RE: Anthony De La Torre  [address] [facility] [address]        To whom it may concern:    Anthony De La Torre is under my professional care.  Anthony De La Torre is under my professional care. Please limit Rolling to 4 hours every other day.   Please limit APM to 4 hours every other day, alternatin every other day with Rolling for 6 months.   Please try to maximize task rotations to avoid over use syndromes in the future.     Sincerely,        Dr. Stanley Leroy

## 2022-08-24 ENCOUNTER — TELEPHONE (OUTPATIENT)
Dept: FAMILY MEDICINE | Facility: CLINIC | Age: 57
End: 2022-08-24

## 2022-10-21 ENCOUNTER — LAB (OUTPATIENT)
Dept: LAB | Facility: CLINIC | Age: 57
End: 2022-10-21
Payer: COMMERCIAL

## 2022-10-21 DIAGNOSIS — I10 ESSENTIAL HYPERTENSION WITH GOAL BLOOD PRESSURE LESS THAN 140/90: ICD-10-CM

## 2022-10-21 DIAGNOSIS — E78.5 HYPERLIPIDEMIA WITH TARGET LDL LESS THAN 130: ICD-10-CM

## 2022-10-21 LAB
ANION GAP SERPL CALCULATED.3IONS-SCNC: 13 MMOL/L (ref 7–15)
BUN SERPL-MCNC: 19.8 MG/DL (ref 6–20)
CALCIUM SERPL-MCNC: 10 MG/DL (ref 8.6–10)
CHLORIDE SERPL-SCNC: 102 MMOL/L (ref 98–107)
CHOLEST SERPL-MCNC: 201 MG/DL
CREAT SERPL-MCNC: 0.69 MG/DL (ref 0.67–1.17)
DEPRECATED HCO3 PLAS-SCNC: 26 MMOL/L (ref 22–29)
GFR SERPL CREATININE-BSD FRML MDRD: >90 ML/MIN/1.73M2
GLUCOSE SERPL-MCNC: 119 MG/DL (ref 70–99)
HDLC SERPL-MCNC: 44 MG/DL
LDLC SERPL CALC-MCNC: 128 MG/DL
NONHDLC SERPL-MCNC: 157 MG/DL
POTASSIUM SERPL-SCNC: 4 MMOL/L (ref 3.4–5.3)
SODIUM SERPL-SCNC: 141 MMOL/L (ref 136–145)
TRIGL SERPL-MCNC: 147 MG/DL

## 2022-10-21 PROCEDURE — 36415 COLL VENOUS BLD VENIPUNCTURE: CPT

## 2022-10-21 PROCEDURE — 80048 BASIC METABOLIC PNL TOTAL CA: CPT

## 2022-10-21 PROCEDURE — 80061 LIPID PANEL: CPT

## 2022-10-25 DIAGNOSIS — I10 BENIGN ESSENTIAL HYPERTENSION: ICD-10-CM

## 2022-10-26 RX ORDER — AMLODIPINE BESYLATE 2.5 MG/1
TABLET ORAL
Qty: 90 TABLET | Refills: 2 | Status: SHIPPED | OUTPATIENT
Start: 2022-10-26 | End: 2023-07-24

## 2022-11-09 DIAGNOSIS — I10 ESSENTIAL HYPERTENSION WITH GOAL BLOOD PRESSURE LESS THAN 140/90: ICD-10-CM

## 2022-11-11 RX ORDER — LOSARTAN POTASSIUM 100 MG/1
TABLET ORAL
Qty: 90 TABLET | Refills: 3 | Status: SHIPPED | OUTPATIENT
Start: 2022-11-11 | End: 2023-11-07

## 2022-11-20 ENCOUNTER — HEALTH MAINTENANCE LETTER (OUTPATIENT)
Age: 57
End: 2022-11-20

## 2022-12-05 ENCOUNTER — OFFICE VISIT (OUTPATIENT)
Dept: FAMILY MEDICINE | Facility: CLINIC | Age: 57
End: 2022-12-05
Payer: COMMERCIAL

## 2022-12-05 VITALS
DIASTOLIC BLOOD PRESSURE: 84 MMHG | RESPIRATION RATE: 16 BRPM | OXYGEN SATURATION: 98 % | HEIGHT: 67 IN | BODY MASS INDEX: 27.55 KG/M2 | SYSTOLIC BLOOD PRESSURE: 134 MMHG | WEIGHT: 175.5 LBS | TEMPERATURE: 97.6 F | HEART RATE: 80 BPM

## 2022-12-05 DIAGNOSIS — I10 PRIMARY HYPERTENSION: Primary | ICD-10-CM

## 2022-12-05 DIAGNOSIS — Z12.11 SCREEN FOR COLON CANCER: ICD-10-CM

## 2022-12-05 DIAGNOSIS — Z00.00 ROUTINE GENERAL MEDICAL EXAMINATION AT A HEALTH CARE FACILITY: ICD-10-CM

## 2022-12-05 DIAGNOSIS — Z12.11 SCREENING FOR COLON CANCER: ICD-10-CM

## 2022-12-05 DIAGNOSIS — J45.20 MILD INTERMITTENT ASTHMA WITHOUT COMPLICATION: ICD-10-CM

## 2022-12-05 PROCEDURE — 90677 PCV20 VACCINE IM: CPT | Performed by: FAMILY MEDICINE

## 2022-12-05 PROCEDURE — 99396 PREV VISIT EST AGE 40-64: CPT | Mod: 25 | Performed by: FAMILY MEDICINE

## 2022-12-05 PROCEDURE — 90471 IMMUNIZATION ADMIN: CPT | Performed by: FAMILY MEDICINE

## 2022-12-05 ASSESSMENT — ENCOUNTER SYMPTOMS
FREQUENCY: 0
PALPITATIONS: 0
NERVOUS/ANXIOUS: 0
HEMATOCHEZIA: 0
SHORTNESS OF BREATH: 0
JOINT SWELLING: 0
CONSTIPATION: 0
HEADACHES: 0
NAUSEA: 0
WEAKNESS: 0
CHILLS: 0
ABDOMINAL PAIN: 0
HEMATURIA: 0
SORE THROAT: 0
MYALGIAS: 0
EYE PAIN: 0
FEVER: 0
ARTHRALGIAS: 1
DYSURIA: 0
COUGH: 1
HEARTBURN: 0
PARESTHESIAS: 0
DIARRHEA: 0
DIZZINESS: 0

## 2022-12-05 ASSESSMENT — ASTHMA QUESTIONNAIRES
QUESTION_2 LAST FOUR WEEKS HOW OFTEN HAVE YOU HAD SHORTNESS OF BREATH: NOT AT ALL
ACT_TOTALSCORE: 23
QUESTION_5 LAST FOUR WEEKS HOW WOULD YOU RATE YOUR ASTHMA CONTROL: WELL CONTROLLED
QUESTION_3 LAST FOUR WEEKS HOW OFTEN DID YOUR ASTHMA SYMPTOMS (WHEEZING, COUGHING, SHORTNESS OF BREATH, CHEST TIGHTNESS OR PAIN) WAKE YOU UP AT NIGHT OR EARLIER THAN USUAL IN THE MORNING: ONCE OR TWICE
QUESTION_4 LAST FOUR WEEKS HOW OFTEN HAVE YOU USED YOUR RESCUE INHALER OR NEBULIZER MEDICATION (SUCH AS ALBUTEROL): NOT AT ALL
QUESTION_1 LAST FOUR WEEKS HOW MUCH OF THE TIME DID YOUR ASTHMA KEEP YOU FROM GETTING AS MUCH DONE AT WORK, SCHOOL OR AT HOME: NONE OF THE TIME
ACT_TOTALSCORE: 23

## 2022-12-05 ASSESSMENT — PAIN SCALES - GENERAL: PAINLEVEL: NO PAIN (0)

## 2022-12-05 NOTE — PROGRESS NOTES
SUBJECTIVE:   CC: Robert is an 57 year old who presents for preventative health visit.     Patient has been advised of split billing requirements and indicates understanding: Yes     Healthy Habits:     Getting at least 3 servings of Calcium per day:  Yes    Bi-annual eye exam:  NO    Dental care twice a year:  Yes    Sleep apnea or symptoms of sleep apnea:  Daytime drowsiness    Diet:  Regular (no restrictions) and Low salt    Frequency of exercise:  2-3 days/week    Duration of exercise:  15-30 minutes    Taking medications regularly:  Yes    Medication side effects:  Not applicable    PHQ-2 Total Score: 0    Additional concerns today:  No      Today's PHQ-2 Score:   PHQ-2 ( 1999 [employer]) 12/5/2022   Q1: Little interest or pleasure in doing things 0   Q2: Feeling down, depressed or hopeless 0   PHQ-2 Score 0   PHQ-2 Total Score (12-17 Years)- Positive if 3 or more points; Administer PHQ-A if positive -   Q1: Little interest or pleasure in doing things Not at all   Q2: Feeling down, depressed or hopeless Not at all   PHQ-2 Score 0     Social History     Tobacco Use     Smoking status: Every Day     Packs/day: 1.00     Years: 20.00     Pack years: 20.00     Types: Cigarettes     Smokeless tobacco: Never     Tobacco comments:     1.00 ppd or less   Substance Use Topics     Alcohol use: Yes     Comment: Occasional - 6-7 drinks per week     If you drink alcohol do you typically have >3 drinks per day or >7 drinks per week? No    Alcohol Use 12/5/2022   Prescreen: >3 drinks/day or >7 drinks/week? No   Prescreen: >3 drinks/day or >7 drinks/week? -   No flowsheet data found.    Last PSA:   PSA   Date Value Ref Range Status   06/19/2019 0.38 0 - 4 ug/L Final     Comment:     Assay Method:  Chemiluminescence using Siemens Vista analyzer     Prostate Specific Antigen Screen   Date Value Ref Range Status   10/28/2021 0.45 0.00 - 4.00 ug/L Final       Reviewed orders with patient. Reviewed health maintenance and updated orders  accordingly - Yes  BP Readings from Last 3 Encounters:   12/05/22 (!) 148/88   08/22/22 135/89   04/13/22 (!) 150/88    Wt Readings from Last 3 Encounters:   12/05/22 79.6 kg (175 lb 8 oz)   08/22/22 78.8 kg (173 lb 11.2 oz)   04/13/22 79.8 kg (176 lb)           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)     Past Surgical History:   Procedure Laterality Date     HERNIA REPAIR      Right     PE TUBES  4/05/2010    BMT     SURGICAL HISTORY OF -       Right ankle     VASECTOMY  [id number]    Dr Concepcion       Social History     Tobacco Use     Smoking status: Every Day     Packs/day: 1.00     Years: 20.00     Pack years: 20.00     Types: Cigarettes     Smokeless tobacco: Never     Tobacco comments:     1.00 ppd or less   Substance Use Topics     Alcohol use: Yes     Comment: Occasional - 6-7 drinks per week     Family History   Problem Relation Age of Onset     Lipids Father      Hypertension Father      Musculoskeletal Disorder Father         Epilepsy     Diabetes Paternal Grandmother      Cancer - colorectal Paternal Grandfather      C.A.D. No family hx of      Cerebrovascular Disease No family hx of      Breast Cancer No family hx of      Prostate Cancer No family hx of          Current Outpatient Medications   Medication Sig Dispense Refill     albuterol (PROAIR HFA/PROVENTIL HFA/VENTOLIN HFA) 108 (90 BASE) MCG/ACT Inhaler Inhale 2 puffs into the lungs every 6 hours as needed for shortness of breath /  dyspnea 1 Inhaler 6     amLODIPine (NORVASC) 2.5 MG tablet TAKE 1 TABLET DAILY 90 tablet 2     ascorbic acid (VITAMIN C) 1000 MG TABS Take 1,000 mg by mouth daily.       aspirin 81 MG chewable tablet Take 81 mg by mouth daily.       Bioflavonoid Products (BIOFLEX PO) Take 1-2 tablets by mouth daily.       buPROPion (WELLBUTRIN SR) 150 MG 12 hr tablet Take 1 tablet (150 mg) by mouth 2 times daily 60 tablet 1     cetirizine HCl 10 MG CAPS Take 25 mg by mouth daily as needed       fish oil-omega-3 fatty acids 1000 MG capsule Take 2 g by mouth daily.       fluticasone (FLONASE) 50 MCG/ACT nasal spray        hydrochlorothiazide (HYDRODIURIL) 25 MG tablet Take 1 tablet (25 mg) by mouth daily 90 tablet 3     losartan (COZAAR) 100 MG tablet TAKE 1 TABLET DAILY 90 tablet 3     sildenafil (REVATIO) 20 MG tablet 2-4 tabs prior to sex 90 tablet 3     cephALEXin (KEFLEX) 500 MG capsule Take 1 capsule (500 mg) by mouth 3 times daily (Patient not taking: Reported on 8/22/2022) 21 capsule 0     rosuvastatin (CRESTOR) 10 MG tablet Take 0.5 tablets (5 mg) by mouth daily (Patient not taking: Reported on 12/5/2022) 45 tablet 2     Allergies   Allergen Reactions     Nkda [No Known Drug Allergies]      Seasonal Allergies        Reviewed and updated as needed this visit by clinical staff    Allergies  Meds   Med Hx  Surg Hx  Fam Hx          Reviewed and updated as needed this visit by Provider                   Review of Systems   Constitutional: Negative for chills and fever.   HENT: Positive for congestion and hearing loss. Negative for ear pain and sore throat.    Eyes: Negative for pain and visual disturbance.   Respiratory: Positive for cough. Negative for shortness of breath.    Cardiovascular: Negative for chest pain, palpitations and peripheral edema.   Gastrointestinal: Negative for abdominal pain, constipation, diarrhea, heartburn, hematochezia and nausea.   Genitourinary: Negative for dysuria, frequency, genital sores,  "hematuria, impotence, penile discharge and urgency.   Musculoskeletal: Positive for arthralgias. Negative for joint swelling and myalgias.   Skin: Negative for rash.   Neurological: Negative for dizziness, weakness, headaches and paresthesias.   Psychiatric/Behavioral: Negative for mood changes. The patient is not nervous/anxious.      CONSTITUTIONAL: NEGATIVE for fever, chills, change in weight  INTEGUMENTARY/SKIN: NEGATIVE for worrisome rashes, moles or lesions  EYES: NEGATIVE for vision changes or irritation  ENT: NEGATIVE for ear, mouth and throat problems  RESP: NEGATIVE for significant cough or SOB  CV: NEGATIVE for chest pain, palpitations or peripheral edema  GI: NEGATIVE for nausea, abdominal pain, heartburn, or change in bowel habits   male: negative for dysuria, hematuria, decreased urinary stream, erectile dysfunction, urethral discharge  MUSCULOSKELETAL: NEGATIVE for significant arthralgias or myalgia  NEURO: NEGATIVE for weakness, dizziness or paresthesias  PSYCHIATRIC: NEGATIVE for changes in mood or affect    OBJECTIVE:   BP (!) 148/88   Pulse 80   Temp 97.6  F (36.4  C) (Tympanic)   Resp 16   Ht 1.709 m (5' 7.3\")   Wt 79.6 kg (175 lb 8 oz)   SpO2 98%   BMI 27.24 kg/m      Physical Exam  GENERAL: healthy, alert and no distress  NECK: no adenopathy, no asymmetry, masses, or scars and thyroid normal to palpation  RESP: lungs clear to auscultation - no rales, rhonchi or wheezes  CV: regular rate and rhythm, normal S1 S2, no S3 or S4, no murmur, click or rub, no peripheral edema and peripheral pulses strong  ABDOMEN: soft, nontender, no hepatosplenomegaly, no masses and bowel sounds normal  MS: no gross musculoskeletal defects noted, no edema    Diagnostic Test Results:  Labs reviewed in Epic    ASSESSMENT/PLAN:   (I10) Primary hypertension  (primary encounter diagnosis)  Good control.  Continue currant medications, continue to monitor.      (Z00.00) Routine general medical examination at a " health care facility    (J45.20) Mild intermittent asthma without complication  Good control.  Continue currant medications, continue to monitor.        Patient has been advised of split billing requirements and indicates understanding: Yes      COUNSELING:   Reviewed preventive health counseling, as reflected in patient instructions       Regular exercise       Healthy diet/nutrition       Vision screening       Hearing screening       Colorectal cancer screening       Prostate cancer screening      He reports that he has been smoking cigarettes. He has a 20.00 pack-year smoking history. He has never used smokeless tobacco.  Nicotine/Tobacco Cessation Plan:       Stanley Leroy MD  [facility]

## 2022-12-05 NOTE — LETTER
My Asthma Action Plan    Name: Anthony De La Torre   YOB: 1965  Date: 12/5/2022   My doctor: Stanley Leroy MD   My clinic: [facility]        My Rescue Medicine:   Albuterol inhaler (Proair/Ventolin/Proventil HFA)  2-4 puffs EVERY 4 HOURS as needed. Use a spacer if recommended by your provider.   My Asthma Severity:   Intermittent / Exercise Induced  Know your asthma triggers: upper respiratory infections and allergies.             GREEN ZONE   Good Control    I feel good    No cough or wheeze    Can work, sleep and play without asthma symptoms       Take your asthma control medicine every day.     1. If exercise triggers your asthma, take your rescue medication    15 minutes before exercise or sports, and    During exercise if you have asthma symptoms  2. Spacer to use with inhaler: If you have a spacer, make sure to use it with your inhaler             YELLOW ZONE Getting Worse  I have ANY of these:    I do not feel good    Cough or wheeze    Chest feels tight    Wake up at night   1. Keep taking your Green Zone medications  2. Start taking your rescue medicine:    every 20 minutes for up to 1 hour. Then every 4 hours for 24-48 hours.  3. If you stay in the Yellow Zone for more than 12-24 hours, contact your doctor.  4. If you do not return to the Green Zone in 12-24 hours or you get worse, start taking your oral steroid medicine if prescribed by your provider.           RED ZONE Medical Alert - Get Help  I have ANY of these:    I feel awful    Medicine is not helping    Breathing getting harder    Trouble walking or talking    Nose opens wide to breathe       1. Take your rescue medicine NOW  2. If your provider has prescribed an oral steroid medicine, start taking it NOW  3. Call your doctor NOW  4. If you are still in the Red Zone after 20 minutes and you have not reached your doctor:    Take your rescue medicine again and    Call 911 or go to the emergency room right away    See  your regular doctor within 2 weeks of an Emergency Room or Urgent Care visit for follow-up treatment.          Annual Reminders:  Meet with Asthma Educator,  Flu Shot in the Fall, consider Pneumonia Vaccination for patients with asthma (aged 19 and older).    Pharmacy:    [facility] [place] [place] - [address]  [facility] - [address]  [facility] - [address]    Electronically signed by Stanley Leroy MD   Date: 12/05/22                    Asthma Triggers  How To Control Things That Make Your Asthma Worse    Triggers are things that make your asthma worse.  Look at the list below to help you find your triggers and   what you can do about them. You can help prevent asthma flare-ups by staying away from your triggers.      Trigger                                                          What you can do   Cigarette Smoke  Tobacco smoke can make asthma worse. Do not allow smoking in your home, car or around you.  Be sure no one smokes at a child s day care or school.  If you smoke, ask your health care provider for ways to help you quit.  Ask family members to quit too.  Ask your health care provider for a referral to Quit Plan to help you quit smoking, or call 4-670-599-PLAN.     Colds, Flu, Bronchitis  These are common triggers of asthma. Wash your hands often.  Don t touch your eyes, nose or mouth.  Get a flu shot every year.     Dust Mites  These are tiny bugs that live in cloth or carpet. They are too small to see. Wash sheets and blankets in hot water every week.   Encase pillows and mattress in dust mite proof covers.  Avoid having carpet if you can. If you have carpet, vacuum weekly.   Use a dust mask and HEPA vacuum.   Pollen and Outdoor Mold  Some people are allergic to trees, grass, or weed pollen, or molds. Try to keep your windows closed.  Limit time out doors when pollen count is high.    Ask you health care provider about taking medicine during allergy season.     Animal Dander  Some people are allergic to skin flakes, urine or saliva from pets with fur or feathers. Keep pets with fur or feathers out of your home.    If you can t keep the pet outdoors, then keep the pet out of your bedroom.  Keep the bedroom door closed.  Keep pets off cloth furniture and away from stuffed toys.     Mice, Rats, and Cockroaches  Some people are allergic to the waste from these pests.   Cover food and garbage.  Clean up spills and food crumbs.  Store grease in the refrigerator.   Keep food out of the bedroom.   Indoor Mold  This can be a trigger if your home has high moisture. Fix leaking faucets, pipes, or other sources of water.   Clean moldy surfaces.  Dehumidify basement if it is damp and smelly.   Smoke, Strong Odors, and Sprays  These can reduce air quality. Stay away from strong odors and sprays, such as perfume, powder, hair spray, paints, smoke incense, paint, cleaning products, candles and new carpet.   Exercise or Sports  Some people with asthma have this trigger. Be active!  Ask your doctor about taking medicine before sports or exercise to prevent symptoms.    Warm up for 5-10 minutes before and after sports or exercise.     Other Triggers of Asthma  Cold air:  Cover your nose and mouth with a scarf.  Sometimes laughing or crying can be a trigger.  Some medicines and food can trigger asthma.

## 2022-12-25 DIAGNOSIS — I10 ESSENTIAL HYPERTENSION WITH GOAL BLOOD PRESSURE LESS THAN 140/90: ICD-10-CM

## 2022-12-26 RX ORDER — HYDROCHLOROTHIAZIDE 25 MG/1
TABLET ORAL
Qty: 90 TABLET | Refills: 1 | Status: SHIPPED | OUTPATIENT
Start: 2022-12-26 | End: 2023-06-26

## 2023-01-26 ENCOUNTER — MYC MEDICAL ADVICE (OUTPATIENT)
Dept: FAMILY MEDICINE | Facility: CLINIC | Age: 58
End: 2023-01-26
Payer: COMMERCIAL

## 2023-01-26 NOTE — LETTER
[facility]  [address]  [facility] [address]  Phone: [phone]      January 27, 2023      RE: Anthony De La Torre  [address] [facility] [address]        To whom it may concern:    \Anthony De La Torre is under my professional care.  Anthony De La Torre is under my professional care. Please limit Rolling to 4 hours every other day.   Please limit APM to 4 hours every other day, alternatin every other day with Rolling for 6 months.   Please try to maximize task rotations to avoid over use syndromes in the future.         Sincerely,          Dr. Stanley Leroy

## 2023-03-08 ENCOUNTER — TELEPHONE (OUTPATIENT)
Dept: SURGERY | Facility: CLINIC | Age: 58
End: 2023-03-08
Payer: COMMERCIAL

## 2023-03-08 NOTE — TELEPHONE ENCOUNTER
Screening Questions  BLUE  KIND OF PREP RED  LOCATION [review exclusion criteria] GREEN  SEDATION TYPE        Y Are you active on mychart?       [place] Ordering/Referring Provider?        [employer] What type of coverage do you have?      N Have you had a positive covid test in the last 14 days?     27.24 1. BMI  [BMI 40+ - review exclusion criteria]    Y  2. Are you able to give consent for your medical care? [IF NO,RN REVIEW]          N  3. Are you taking any prescription pain medications on a routine schedule   (ex narcotics: oxycodone, roxicodone, oxycontin,  and percocet)? [RN Review]        N  3a. EXTENDED PREP What kind of prescription?     N 4. Do you have any chemical dependencies such as alcohol, street drugs, or methadone?        **If yes 3- 5 , please schedule with MAC sedation.**          IF YES TO ANY 6 - 10 - HOSPITAL SETTING ONLY.     n 6.   Do you need assistance transferring?     N 7.   Have you had a heart or lung transplant?    n 8.   Are you currently on dialysis?   N 9.   Do you use daily home oxygen?   N 10. Do you take nitroglycerin?   10a. N If yes, how often?     11. [FEMALES]  N Are you currently pregnant?    11a. N If yes, how many weeks? [ Greater than 12 weeks, OR NEEDED]    n 12. Do you have Pulmonary Hypertension? *NEED PAC APPT AT [facility] w/ MAC*     N 13. [review exclusion criteria]  Do you have any implantable devices in your body (pacemaker, defib, LVAD)?    n 14. In the past 6 months, have you had any heart related issues including cardiomyopathy or heart attack?     14a. N If yes, did it require cardiac stenting if so when?     N 15. Have you had a stroke or Transient ischemic attack (TIA - aka  mini stroke ) within 6 months?      N 16. Do you have mod to severe Obstructive Sleep Apnea?  [Hospital only]    N 17. Do you have SEVERE AND UNCONTROLLED asthma? *NEED PAC APPT AT [facility] w/MAC*     18. Are you currently taking any blood thinners?     18a. No. Continue to 19.   18b. Yes/no  "Blood Thinner: No [CONTINUE TO #19]    N 19. Do you take the medication Phentermine?    19a. If yes, \"Hold for 7 days before procedure.  Please consult your prescribing provider if you have questions about holding this medication.\"     N  20. Do you have chronic kidney disease?      N  21. Do you have a diagnosis of diabetes?     n  22. On a regular basis do you go 3-5 days between bowel movements?     See below 23. Preferred LOCAL Pharmacy for Pre Prescription    [ LIST ONLY ONE PHARMACY]        [facility] [place], [place] - [address] [facility] [address]      - CLOSING REMINDERS -    Informed patient they will need an adult    Cannot take any type of public or medical transportation alone    Conscious Sedation- Needs  for 6 hours after the procedure       MAC/General-Needs  for 24 hours after procedure    Pre-Procedure Covid test to be completed [[facility] PCR Testing Required]    Confirmed Nurse will call to complete assessment       - SCHEDULING DETAILS -  n Hospital Setting Required? If yes, what is the exclusion?: n   [place]  Surgeon    6-12-23  Date of Procedure  Lower Endoscopy [Colonoscopy]  Type of Procedure Scheduled  [facility]-If you answer yes to questions #8, #20, #21Which Colonoscopy Prep was Sent?     GEN Sedation Type     N PAC / Pre-op Required                 "

## 2023-06-01 RX ORDER — BISACODYL 5 MG/1
TABLET, DELAYED RELEASE ORAL
Qty: 4 TABLET | Refills: 0 | Status: SHIPPED | OUTPATIENT
Start: 2023-06-01 | End: 2024-01-04

## 2023-06-09 ENCOUNTER — ANESTHESIA EVENT (OUTPATIENT)
Dept: GASTROENTEROLOGY | Facility: CLINIC | Age: 58
End: 2023-06-09
Payer: COMMERCIAL

## 2023-06-09 ASSESSMENT — LIFESTYLE VARIABLES: TOBACCO_USE: 1

## 2023-06-09 NOTE — ANESTHESIA PREPROCEDURE EVALUATION
Anesthesia Pre-Procedure Evaluation    Patient: Anthony De La Torre   MRN: [id number] : 1965        Procedure : Procedure(s):  Colonoscopy          Past Medical History:   Diagnosis Date     Lumbago 2008     NO ACTIVE PROBLEMS       Past Surgical History:   Procedure Laterality Date     HERNIA REPAIR      Right     PE TUBES  2010    BMT     SURGICAL HISTORY OF -       Right ankle     VASECTOMY  [id number]    Dr Concepcion      Allergies   Allergen Reactions     Nkda [No Known Drug Allergy]      Seasonal Allergies       Social History     Tobacco Use     Smoking status: Every Day     Packs/day: 1.00     Years: 20.00     Pack years: 20.00     Types: Cigarettes     Smokeless tobacco: Never     Tobacco comments:     1.00 ppd or less   Vaping Use     Vaping status: Never Used   Substance Use Topics     Alcohol use: Yes     Comment: Occasional - 6-7 drinks per week      Wt Readings from Last 1 Encounters:   22 79.6 kg (175 lb 8 oz)        Anesthesia Evaluation   Pt has had prior anesthetic. Type: General and MAC.        ROS/MED HX  ENT/Pulmonary:     (+) tobacco use, Past use, asthma     Neurologic:       Cardiovascular:     (+) Dyslipidemia hypertension-----    METS/Exercise Tolerance: >4 METS    Hematologic:       Musculoskeletal: Comment: Plantar fascial fibromatosis  Sacroiliitis     cervicalgia      GI/Hepatic:       Renal/Genitourinary:       Endo:       Psychiatric/Substance Use:       Infectious Disease:       Malignancy:       Other:            Physical Exam    Airway  airway exam normal      Mallampati: II   TM distance: > 3 FB   Neck ROM: full   Mouth opening: > 3 cm    Respiratory Devices and Support         Dental       (+) Minor Abnormalities - some fillings, tiny chips      Cardiovascular   cardiovascular exam normal          Pulmonary   pulmonary exam normal                OUTSIDE LABS:  CBC:   Lab Results   Component Value Date    WBC 6.5 2016    WBC 9.0 01/15/2013    HGB 14.1  06/20/2016    HGB 14.1 01/15/2013    HCT 42.2 06/20/2016    HCT 41.6 01/15/2013     06/20/2016     01/15/2013     BMP:   Lab Results   Component Value Date     10/21/2022     10/28/2021    POTASSIUM 4.0 10/21/2022    POTASSIUM 3.9 10/28/2021    CHLORIDE 102 10/21/2022    CHLORIDE 106 10/28/2021    CO2 26 10/21/2022    CO2 29 10/28/2021    BUN 19.8 10/21/2022    BUN 22 10/28/2021    CR 0.69 10/21/2022    CR 0.64 (L) 10/28/2021     (H) 10/21/2022     (H) 10/28/2021     COAGS: No results found for: PTT, INR, FIBR  POC: No results found for: BGM, HCG, HCGS  HEPATIC:   Lab Results   Component Value Date    ALBUMIN 5.0 01/15/2013    PROTTOTAL 8.4 01/15/2013    ALT 51 01/15/2013    AST 36 01/15/2013    GGT 44 07/14/2010    ALKPHOS 55 01/15/2013    BILITOTAL 0.5 01/15/2013     OTHER:   Lab Results   Component Value Date    ANNAMARIA 10.0 10/21/2022    TSH 1.06 06/20/2016    SED 5 06/19/2017       Anesthesia Plan    ASA Status:  2   NPO Status:  NPO Appropriate    Anesthesia Type: General.   Induction: Propofol, Intravenous.   Maintenance: TIVA.        Consents    Anesthesia Plan(s) and associated risks, benefits, and realistic alternatives discussed. Questions answered and patient/representative(s) expressed understanding.     - Discussed: Risks, Benefits and Alternatives for BOTH SEDATION and the PROCEDURE were discussed     - Discussed with:  Patient         Postoperative Care    Pain management: Multi-modal analgesia, IV analgesics, Oral pain medications.   PONV prophylaxis: Ondansetron (or other 5HT-3), Dexamethasone or Solumedrol, Background Propofol Infusion     Comments:                Ascencion Mcclelland CRNA, APRN [facility]

## 2023-06-12 ENCOUNTER — ANESTHESIA (OUTPATIENT)
Dept: GASTROENTEROLOGY | Facility: CLINIC | Age: 58
End: 2023-06-12
Payer: COMMERCIAL

## 2023-06-12 ENCOUNTER — HOSPITAL ENCOUNTER (OUTPATIENT)
Facility: CLINIC | Age: 58
Discharge: HOME | End: 2023-06-12
Attending: SURGERY | Admitting: SURGERY
Payer: COMMERCIAL

## 2023-06-12 VITALS
SYSTOLIC BLOOD PRESSURE: 112 MMHG | RESPIRATION RATE: 16 BRPM | WEIGHT: 175 LBS | BODY MASS INDEX: 27.47 KG/M2 | TEMPERATURE: 97.9 F | HEART RATE: 80 BPM | OXYGEN SATURATION: 97 % | HEIGHT: 67 IN | DIASTOLIC BLOOD PRESSURE: 83 MMHG

## 2023-06-12 DIAGNOSIS — Z12.11 SPECIAL SCREENING FOR MALIGNANT NEOPLASMS, COLON: Primary | ICD-10-CM

## 2023-06-12 LAB — COLONOSCOPY: NORMAL

## 2023-06-12 PROCEDURE — 45385 COLONOSCOPY W/LESION REMOVAL: CPT | Mod: PT | Performed by: SURGERY

## 2023-06-12 PROCEDURE — 250N000011 HC RX IP 250 OP 636: Performed by: NURSE ANESTHETIST, CERTIFIED REGISTERED

## 2023-06-12 PROCEDURE — 250N000009 HC RX 250: Performed by: NURSE ANESTHETIST, CERTIFIED REGISTERED

## 2023-06-12 PROCEDURE — 258N000003 HC RX IP 258 OP 636: Performed by: SURGERY

## 2023-06-12 PROCEDURE — 45385 COLONOSCOPY W/LESION REMOVAL: CPT | Performed by: SURGERY

## 2023-06-12 PROCEDURE — 88305 TISSUE EXAM BY PATHOLOGIST: CPT | Mod: 26 | Performed by: PATHOLOGY

## 2023-06-12 PROCEDURE — 370N000017 HC ANESTHESIA TECHNICAL FEE, PER MIN: Performed by: SURGERY

## 2023-06-12 PROCEDURE — 250N000009 HC RX 250: Performed by: SURGERY

## 2023-06-12 PROCEDURE — 88305 TISSUE EXAM BY PATHOLOGIST: CPT | Mod: TC | Performed by: SURGERY

## 2023-06-12 RX ORDER — PROPOFOL 10 MG/ML
INJECTION, EMULSION INTRAVENOUS PRN
Status: DISCONTINUED | OUTPATIENT
Start: 2023-06-12 | End: 2023-06-12

## 2023-06-12 RX ORDER — ONDANSETRON 4 MG/1
4 TABLET, ORALLY DISINTEGRATING ORAL EVERY 30 MIN PRN
Status: DISCONTINUED | OUTPATIENT
Start: 2023-06-12 | End: 2023-06-12 | Stop reason: HOSPADM

## 2023-06-12 RX ORDER — LIDOCAINE HYDROCHLORIDE 20 MG/ML
INJECTION, SOLUTION INFILTRATION; PERINEURAL PRN
Status: DISCONTINUED | OUTPATIENT
Start: 2023-06-12 | End: 2023-06-12

## 2023-06-12 RX ORDER — ONDANSETRON 2 MG/ML
4 INJECTION INTRAMUSCULAR; INTRAVENOUS EVERY 30 MIN PRN
Status: DISCONTINUED | OUTPATIENT
Start: 2023-06-12 | End: 2023-06-12 | Stop reason: HOSPADM

## 2023-06-12 RX ORDER — PROPOFOL 10 MG/ML
INJECTION, EMULSION INTRAVENOUS CONTINUOUS PRN
Status: DISCONTINUED | OUTPATIENT
Start: 2023-06-12 | End: 2023-06-12

## 2023-06-12 RX ORDER — SODIUM CHLORIDE, SODIUM LACTATE, POTASSIUM CHLORIDE, CALCIUM CHLORIDE 600; 310; 30; 20 MG/100ML; MG/100ML; MG/100ML; MG/100ML
INJECTION, SOLUTION INTRAVENOUS CONTINUOUS
Status: DISCONTINUED | OUTPATIENT
Start: 2023-06-12 | End: 2023-06-12 | Stop reason: HOSPADM

## 2023-06-12 RX ORDER — LIDOCAINE 40 MG/G
CREAM TOPICAL
Status: DISCONTINUED | OUTPATIENT
Start: 2023-06-12 | End: 2023-06-12 | Stop reason: HOSPADM

## 2023-06-12 RX ADMIN — SODIUM CHLORIDE, POTASSIUM CHLORIDE, SODIUM LACTATE AND CALCIUM CHLORIDE: 600; 310; 30; 20 INJECTION, SOLUTION INTRAVENOUS at 09:29

## 2023-06-12 RX ADMIN — LIDOCAINE HYDROCHLORIDE 50 MG: 20 INJECTION, SOLUTION INFILTRATION; PERINEURAL at 09:59

## 2023-06-12 RX ADMIN — LIDOCAINE HYDROCHLORIDE 0.3 ML: 10 INJECTION, SOLUTION EPIDURAL; INFILTRATION; INTRACAUDAL; PERINEURAL at 09:29

## 2023-06-12 RX ADMIN — PROPOFOL 50 MG: 10 INJECTION, EMULSION INTRAVENOUS at 09:59

## 2023-06-12 RX ADMIN — PROPOFOL 150 MCG/KG/MIN: 10 INJECTION, EMULSION INTRAVENOUS at 09:59

## 2023-06-12 ASSESSMENT — ACTIVITIES OF DAILY LIVING (ADL): ADLS_ACUITY_SCORE: 35

## 2023-06-12 NOTE — LETTER
Anthony De La Torre  [address] [place] [id number]    June 17, 2023    Dear Anthony,  This letter is written to inform you of the results of your recent colonoscopy.  Your examination showed polyp(s) in your transverse colon. All polyps were removed in their entirety and sent for review by a pathologist. As you will see on the pathology report below, the tissue(s) were tubular adenomatous polyps. Your examination was otherwise without abnormality.    Final Diagnosis   Colon, transverse: Polypectomy:  - Tubular adenoma  - No evidence of high-grade dysplasia or invasive malignancy      Adenomatous polyps are entirely benign (non-cancerous); however, patients who have developed these polyps are at an increased risk for developing additional polyps in the future. If these are not eventually removed, there is a risk of developing colon cancer. We will advise more frequent examinations with you because of the risk associated with this type of polyp.    Given these findings, your personal history of tubular adenomas, I recommend that you undergo a repeat colonoscopy in 5 year(s) for surveillance. We will enter you into a recall system so you receive a reminder closer to the time that you are due for repeat examination.     Please remember that this recommendation is made with the understanding that you are not experiencing persistent changes in bowel function, bleeding per rectum, and/or significant abdominal pain. If you experience these symptoms, please contact your primary care provider for a further evaluation.     If you have any questions or concerns about the results of your colonoscopy or the appropriate follow-up, please contact my assistant at [phone].    Sincerely,        [facility] DO [place] FACOS [facility]  ___

## 2023-06-12 NOTE — ANESTHESIA POSTPROCEDURE EVALUATION
Patient: Anthony De La Torre    Procedure: Procedure(s):  COLONOSCOPY, FLEXIBLE, WITH LESION REMOVAL USING SNARE       Anesthesia Type:  General    Note:  Disposition: Outpatient   Postop Pain Control: Uneventful            Sign Out: Well controlled pain   PONV: No   Neuro/Psych: Uneventful            Sign Out: Acceptable/Baseline neuro status   Airway/Respiratory: Uneventful            Sign Out: Acceptable/Baseline resp. status   CV/Hemodynamics: Uneventful            Sign Out: Acceptable CV status; No obvious hypovolemia; No obvious fluid overload   Other NRE: NONE   DID A NON-ROUTINE EVENT OCCUR? No           Last vitals:  Vitals:    06/12/23 0854   BP: (!) 127/90   Pulse: 88   Temp: 36.8  C (98.3  F)   SpO2: 98%       Electronically Signed By: NATASHA Robertson CRNA  June 12, 2023  10:36 AM

## 2023-06-12 NOTE — H&P
[facility]    Pre-Endoscopy History and Physical     Anthony De La Torre MRN# [id number]   YOB: 1965 Age: 57 year old     Date of Procedure: 6/12/2023  Primary care provider: Stanley Leroy  Type of Endoscopy: Colonoscopy with possible biopsy, possible polypectomy  Reason for Procedure: hx of colon polyps  Type of Anesthesia Anticipated: MAC    HPI:    Anthony is a 57 year old male who will be undergoing the above procedure.  last colon 2017 (TA); multiple famhx of colon cancer? - Pgrandpa    A history and physical has been performed. The patient's medications and allergies have been reviewed. The risks and benefits of the procedure and the sedation options and risks were discussed with the patient.  All questions were answered and informed consent was obtained.      He denies a personal or family history of anesthesia complications or bleeding disorders.     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)        Past Medical History:   Diagnosis Date     Lumbago 6/2/2008     NO ACTIVE PROBLEMS         Past Surgical History:   Procedure Laterality Date     HERNIA REPAIR      Right     PE TUBES  4/05/2010    BMT     SURGICAL HISTORY OF -       Right ankle     VASECTOMY  [id number]    Dr Concepcion       Social History     Tobacco Use     Smoking status: Every Day     Packs/day: 1.00     Years: 20.00     Pack years: 20.00     Types: Cigarettes     Smokeless tobacco: Never     Tobacco  comments:     1.00 ppd or less   Vaping Use     Vaping status: Never Used   Substance Use Topics     Alcohol use: Yes     Comment: Occasional - 6-7 drinks per week       Family History   Problem Relation Age of Onset     Lipids Father      Hypertension Father      Musculoskeletal Disorder Father         Epilepsy     Diabetes Paternal Grandmother      Cancer - colorectal Paternal Grandfather      C.A.D. No family hx of      Cerebrovascular Disease No family hx of      Breast Cancer No family hx of      Prostate Cancer No family hx of        Prior to Admission medications    Medication Sig Start Date End Date Taking? Authorizing Provider   albuterol (PROAIR HFA/PROVENTIL HFA/VENTOLIN HFA) 108 (90 BASE) MCG/ACT Inhaler Inhale 2 puffs into the lungs every 6 hours as needed for shortness of breath / dyspnea 6/19/17  Yes Stanley Leroy MD   amLODIPine (NORVASC) 2.5 MG tablet TAKE 1 TABLET DAILY 10/26/22  Yes Stanley Leroy MD   ascorbic acid (VITAMIN C) 1000 MG TABS Take 1,000 mg by mouth daily.   Yes Reported, Patient   aspirin 81 MG chewable tablet Take 81 mg by mouth daily.   Yes Reported, Patient   Bioflavonoid Products (BIOFLEX PO) Take 1-2 tablets by mouth daily.   Yes Reported, Patient   bisacodyl (DULCOLAX) 5 MG EC tablet Take 2 tablets at 3 pm the day before your procedure. If your procedure is before 11 am, take 2 additional tablets at 11 pm. If your procedure is after 11 am, take 2 additional tablets at 6 am. For additional instructions refer to your colonoscopy prep instructions. 6/1/23  Yes Nichole [place] MD   buPROPion (WELLBUTRIN SR) 150 MG 12 hr tablet Take 1 tablet (150 mg) by mouth 2 times daily 9/9/20  Yes Stanley Leroy MD   cetirizine HCl 10 MG CAPS Take 25 mg by mouth daily as needed   Yes Reported, Patient   fish oil-omega-3 fatty acids 1000 MG capsule Take 2 g by mouth daily.   Yes Reported, Patient   fluticasone (FLONASE) 50 MCG/ACT nasal spray  4/24/15  Yes Reported, Patient  "  hydrochlorothiazide (HYDRODIURIL) 25 MG tablet TAKE 1 TABLET DAILY 12/26/22  Yes Stanley Leroy MD   losartan (COZAAR) 100 MG tablet TAKE 1 TABLET DAILY 11/11/22  Yes Stanley Leroy MD   polyethylene glycol (GOLYTELY) 236 g suspension The night before the exam at 6 pm drink an 8-ounce glass every 15 minutes until the jug is half empty. If you arrive before 11 AM: Drink the other half of the Golytely jug at 11 PM night before procedure. If you arrive after 11 AM: Drink the other half of the Golytely jug at 6 AM day of procedure. For additional instructions refer to your colonoscopy prep instructions. 6/1/23  Yes Nichole [place] MD   rosuvastatin (CRESTOR) 10 MG tablet Take 0.5 tablets (5 mg) by mouth daily 11/22/20  Yes Stanley Leroy MD   sildenafil (REVATIO) 20 MG tablet 2-4 tabs prior to sex 1/20/22  Yes Stanley Leroy MD       Allergies   Allergen Reactions     Nkda [No Known Drug Allergy]      Seasonal Allergies         REVIEW OF SYSTEMS:   5 point ROS negative except as noted above in HPI, including Gen., Resp., CV, GI &  system review.    PHYSICAL EXAM:   BP (!) 127/90 (BP Location: Left arm)   Pulse 88   Temp 98.3  F (36.8  C) (Oral)   Ht 1.709 m (5' 7.3\")   Wt 79.4 kg (175 lb)   SpO2 98%   BMI 27.17 kg/m   Estimated body mass index is 27.17 kg/m  as calculated from the following:    Height as of this encounter: 1.709 m (5' 7.3\").    Weight as of this encounter: 79.4 kg (175 lb).   Constitutional: Awake, alert, no acute distress.  Eyes: No scleral icterus.  Conjunctiva are without injection.  ENMT: Mucous membranes moist, dentition and gums are intact.   Neck: Soft, supple, trachea midline.    Endocrine: n/a   Lymphatic: There is no cervical, submandibularadenopathy.  Respiratory: normal effortgs   Cardiovascular: S1, S2  Abdomen: Non-distended, non-tender,  No masses,  Musculoskeletal: No spinal or CVA tenderness. Full range of motion in the upper and lower extremities.    Skin: No skin " rashes or lesions to inspection.  No petechia.    Neurologic: alerted and oriented 3x  Psychiatric: The patient's affect is not blunted and mood is appropriate.  DIAGNOSTICS:    Not indicated    IMPRESSION   ASA Class 2 - Mild systemic disease    PLAN:   Plan for Colonoscopy with possible biopsy, possible polypectomy. We discussed the risks, benefits and alternatives and the patient wished to proceed.  Patient is cleared for the above procedure.    The above has been forwarded to the consulting provider.    [facility]

## 2023-06-12 NOTE — ANESTHESIA CARE TRANSFER NOTE
Patient: Anthony De La Torre    Procedure: Procedure(s):  COLONOSCOPY, FLEXIBLE, WITH LESION REMOVAL USING SNARE       Diagnosis: Screening for colon cancer [Z12.11]  Diagnosis Additional Information: No value filed.    Anesthesia Type:   General     Note:    Oropharynx: oropharynx clear of all foreign objects and spontaneously breathing  Level of Consciousness: awake  Oxygen Supplementation: room air    Independent Airway: airway patency satisfactory and stable  Dentition: dentition unchanged  Vital Signs Stable: post-procedure vital signs reviewed and stable  Report to RN Given: handoff report given  Patient transferred to: Phase II    Handoff Report: Identifed the Patient, Identified the Reponsible Provider, Reviewed the pertinent medical history, Discussed the surgical course, Reviewed Intra-OP anesthesia mangement and issues during anesthesia, Set expectations for post-procedure period and Allowed opportunity for questions and acknowledgement of understanding      Vitals:  Vitals Value Taken Time   /76 06/12/23 1034   Temp     Pulse 80 06/12/23 1034   Resp     SpO2 96 % 06/12/23 1035   Vitals shown include unvalidated device data.    Electronically Signed By: NATASHA Robertson CRNA  June 12, 2023  10:36 AM

## 2023-06-13 LAB
PATH REPORT.COMMENTS IMP SPEC: NORMAL
PATH REPORT.COMMENTS IMP SPEC: NORMAL
PATH REPORT.FINAL DX SPEC: NORMAL
PATH REPORT.GROSS SPEC: NORMAL
PATH REPORT.MICROSCOPIC SPEC OTHER STN: NORMAL
PATH REPORT.RELEVANT HX SPEC: NORMAL
PHOTO IMAGE: NORMAL

## 2023-06-19 ENCOUNTER — MYC MEDICAL ADVICE (OUTPATIENT)
Dept: FAMILY MEDICINE | Facility: CLINIC | Age: 58
End: 2023-06-19
Payer: COMMERCIAL

## 2023-06-25 DIAGNOSIS — I10 ESSENTIAL HYPERTENSION WITH GOAL BLOOD PRESSURE LESS THAN 140/90: ICD-10-CM

## 2023-06-26 RX ORDER — HYDROCHLOROTHIAZIDE 25 MG/1
TABLET ORAL
Qty: 90 TABLET | Refills: 3 | Status: SHIPPED | OUTPATIENT
Start: 2023-06-26 | End: 2024-06-18

## 2023-06-26 NOTE — TELEPHONE ENCOUNTER
"Routing refill request to provider for review/approval because:  Due for an annual exam        Requested Prescriptions   Pending Prescriptions Disp Refills    hydrochlorothiazide (HYDRODIURIL) 25 MG tablet [Pharmacy Med Name: HYDROCHLOROTHIAZIDE TABS 25MG] 90 tablet 3     Sig: TAKE 1 TABLET DAILY       Diuretics (Including Combos) Protocol Passed - 6/25/2023 11:19 PM        Passed - Blood pressure under 140/90 in past 12 months     BP Readings from Last 3 Encounters:   06/12/23 112/83   12/05/22 134/84   08/22/22 135/89                 Passed - Recent (12 mo) or future (30 days) visit within the authorizing provider's specialty     Patient has had an office visit with the authorizing provider or a provider within the authorizing providers department within the previous 12 mos or has a future within next 30 days. See \"Patient Info\" tab in inbasket, or \"Choose Columns\" in Meds & Orders section of the refill encounter.              Passed - Medication is active on med list        Passed - Patient is age 18 or older        Passed - Normal serum creatinine on file in past 12 months     Recent Labs   Lab Test 10/21/22  0933   CR 0.69              Passed - Normal serum potassium on file in past 12 months     Recent Labs   Lab Test 10/21/22  0933   POTASSIUM 4.0                    Passed - Normal serum sodium on file in past 12 months     Recent Labs   Lab Test 10/21/22  0933                          Latrice Joy RN 06/26/23 2:23 PM    "

## 2023-07-04 ENCOUNTER — MYC MEDICAL ADVICE (OUTPATIENT)
Dept: FAMILY MEDICINE | Facility: CLINIC | Age: 58
End: 2023-07-04
Payer: COMMERCIAL

## 2023-07-23 DIAGNOSIS — I10 BENIGN ESSENTIAL HYPERTENSION: ICD-10-CM

## 2023-07-24 RX ORDER — AMLODIPINE BESYLATE 2.5 MG/1
TABLET ORAL
Qty: 90 TABLET | Refills: 3 | Status: SHIPPED | OUTPATIENT
Start: 2023-07-24 | End: 2024-07-18

## 2023-07-24 NOTE — TELEPHONE ENCOUNTER
"Routing refill request to provider for review/approval because:  Due for an annual exam        Requested Prescriptions   Pending Prescriptions Disp Refills    amLODIPine (NORVASC) 2.5 MG tablet [Pharmacy Med Name: AMLODIPINE BESYLATE TABS 2.5MG] 90 tablet 3     Sig: TAKE 1 TABLET DAILY       Calcium Channel Blockers Protocol  Passed - 7/23/2023 11:27 PM        Passed - Blood pressure under 140/90 in past 12 months     BP Readings from Last 3 Encounters:   06/12/23 112/83   12/05/22 134/84   08/22/22 135/89                 Passed - Recent (12 mo) or future (30 days) visit within the authorizing provider's specialty     Patient has had an office visit with the authorizing provider or a provider within the authorizing providers department within the previous 12 mos or has a future within next 30 days. See \"Patient Info\" tab in inbasket, or \"Choose Columns\" in Meds & Orders section of the refill encounter.              Passed - Medication is active on med list        Passed - Patient is age 18 or older        Passed - Normal serum creatinine on file in past 12 months     Recent Labs   Lab Test 10/21/22  0933   CR 0.69       Ok to refill medication if creatinine is low                   Latrice Joy RN 07/24/23 2:06 PM    "

## 2023-07-31 ENCOUNTER — MYC MEDICAL ADVICE (OUTPATIENT)
Dept: FAMILY MEDICINE | Facility: CLINIC | Age: 58
End: 2023-07-31
Payer: COMMERCIAL

## 2023-08-23 ENCOUNTER — OFFICE VISIT (OUTPATIENT)
Dept: FAMILY MEDICINE | Facility: CLINIC | Age: 58
End: 2023-08-23
Payer: OTHER MISCELLANEOUS

## 2023-08-23 VITALS
RESPIRATION RATE: 14 BRPM | HEART RATE: 97 BPM | WEIGHT: 176.7 LBS | HEIGHT: 67 IN | TEMPERATURE: 98.4 F | DIASTOLIC BLOOD PRESSURE: 82 MMHG | OXYGEN SATURATION: 97 % | BODY MASS INDEX: 27.73 KG/M2 | SYSTOLIC BLOOD PRESSURE: 138 MMHG

## 2023-08-23 DIAGNOSIS — M77.8 TENDONITIS OF WRIST, RIGHT: Primary | ICD-10-CM

## 2023-08-23 PROCEDURE — 99207 PR NO CHARGE LOS: CPT | Performed by: FAMILY MEDICINE

## 2023-08-23 ASSESSMENT — ASTHMA QUESTIONNAIRES: ACT_TOTALSCORE: 25

## 2023-08-23 ASSESSMENT — PAIN SCALES - GENERAL: PAINLEVEL: MILD PAIN (2)

## 2023-08-23 NOTE — LETTER
REPORT OF WORK COMP    [facility]  [address]  [facility] [address]  [phone]      PATIENT DATA    Employee Name: Anthony De La Torre      : 1965     #: [id number]    Work related injury: Yes  Employer at time of injury: [employer]    Employed elsewhere? No  Workers' Compensation Carrier/Managed Care Plan:  [id number]    Today's date: 2023  Date of injury: 21  Date of first visit: 10/29/21    PROVIDER EVALUATION: Please fill in as needed.  Please give copy to employee for employer.    1. Diagnosis: deQuervain syndrome     2. Treatment: spica splint, rest, ice, compresion, elevation, nsaids   3. Medication: ibuprofen  NOTE: When ordering a medication, [place] Rules require Work Comp or WC on prescriptions.    5. Return to work date: 10/22/21  ** WITH RESTRICTIONS? Yes, with work restrictions: * Limited repetitive motion.  DURATION OF LIMITATIONS:   Please limit Rolling to 4 hours every other day.   Please limit APM to 4 hours every other day, alternatin every other day.   Please try to maximize task rotations to avoid over use syndromes in the future.    RESTRICTIONS:   Restrictions apply to home and leisure also.  Please limit Rolling to 4 hours every other day.   Please limit APM to 4 hours every other day, alternatin every other day.   Please try to maximize task rotations to avoid over use syndromes in the future.    Maximum Medical Improvement (Date): no  Any Permanent Partial Disability? Deferred to future exam/consult.    Provider comments:  anticipte he will not tolerate trepatative motion on long term basis    Medical Examiner: Stanley Leroy MD   License or registration: [id number]    Next appointment: In 6 months    CC: Employer, Managed Care Plan/Payor, Patient

## 2023-08-23 NOTE — PROGRESS NOTES
"  Assessment & Plan     (M77.8) Tendonitis of wrist, right  (primary encounter diagnosis)  This is work related.  It has been managed by avoiding a particular repetitive motion related to his work duties of \"[employer]\" and the work station \"[employer]\"   As long as he limit those activities he is symptom free.  When he returns to those activities at work on a frequent basis his symptoms return.  I have written many work comp notes for him, with the only restriction was to limit the frequency and duration of working at those particular stations.  With the very narrow focus of his work restrictions to specific duties at his work, his medical record it VERY CLEARLY SHOWS THIS IS A WORK RELATED INJURY!         BMI:   Estimated body mass index is 27.43 kg/m  as calculated from the following:    Height as of this encounter: 1.709 m (5' 7.3\").    Weight as of this encounter: 80.2 kg (176 lb 11.2 oz).   Weight management plan: Discussed healthy diet and exercise guidelines        Stanley Leroy MD  [facility] ISA Jones is a 57 year old, presenting for the following health issues:  Work Comp        8/23/2023     3:09 PM   Additional Questions   Roomed by Stella Real CMA     -Has had a work injury on his right wrist for the past 32 years. He has seen you for this in the past but work comp isn't covering anything.    History of Present Illness       Reason for visit:  Wrist / workmanship comp.    He eats 2-3 servings of fruits and vegetables daily.He consumes 1 sweetened beverage(s) daily.He exercises with enough effort to increase his heart rate 20 to 29 minutes per day.  He exercises with enough effort to increase his heart rate 4 days per week.   He is taking medications regularly.      Review of Systems   Constitutional, HEENT, cardiovascular, pulmonary, gi and gu systems are negative, except as otherwise noted.      Objective    /82   Pulse 97   Temp 98.4  F (36.9  C) (Tympanic)   Resp 14   " "Ht 1.709 m (5' 7.3\")   Wt 80.2 kg (176 lb 11.2 oz)   SpO2 97%   BMI 27.43 kg/m    Body mass index is 27.43 kg/m .  Physical Exam   GENERAL: healthy, alert and no distress  NECK: no adenopathy, no asymmetry, masses, or scars and thyroid normal to palpation  RESP: lungs clear to auscultation - no rales, rhonchi or wheezes  CV: regular rate and rhythm, normal S1 S2, no S3 or S4, no murmur, click or rub, no peripheral edema and peripheral pulses strong  ABDOMEN: soft, nontender, no hepatosplenomegaly, no masses and bowel sounds normal  MS: no gross musculoskeletal defects noted, no edema                      "

## 2023-09-10 PROBLEM — M77.8 TENDONITIS OF WRIST, RIGHT: Status: ACTIVE | Noted: 2023-09-10

## 2023-11-05 DIAGNOSIS — I10 ESSENTIAL HYPERTENSION WITH GOAL BLOOD PRESSURE LESS THAN 140/90: ICD-10-CM

## 2023-11-06 ENCOUNTER — PATIENT OUTREACH (OUTPATIENT)
Dept: CARE COORDINATION | Facility: CLINIC | Age: 58
End: 2023-11-06
Payer: COMMERCIAL

## 2023-11-07 RX ORDER — LOSARTAN POTASSIUM 100 MG/1
TABLET ORAL
Qty: 90 TABLET | Refills: 3 | Status: SHIPPED | OUTPATIENT
Start: 2023-11-07

## 2024-01-03 ASSESSMENT — ENCOUNTER SYMPTOMS
PARESTHESIAS: 0
HEMATURIA: 0
DIARRHEA: 0
PALPITATIONS: 0
ARTHRALGIAS: 1
HEADACHES: 0
COUGH: 0
ABDOMINAL PAIN: 0
HEMATOCHEZIA: 0
FREQUENCY: 0
JOINT SWELLING: 1
SORE THROAT: 0
SHORTNESS OF BREATH: 0
EYE PAIN: 0
FEVER: 0
NERVOUS/ANXIOUS: 0
CHILLS: 0
DIZZINESS: 0
DYSURIA: 0
WEAKNESS: 0
HEARTBURN: 0
CONSTIPATION: 0
NAUSEA: 0
MYALGIAS: 0

## 2024-01-04 ENCOUNTER — OFFICE VISIT (OUTPATIENT)
Dept: FAMILY MEDICINE | Facility: CLINIC | Age: 59
End: 2024-01-04
Payer: COMMERCIAL

## 2024-01-04 VITALS
SYSTOLIC BLOOD PRESSURE: 138 MMHG | HEART RATE: 91 BPM | WEIGHT: 174.9 LBS | BODY MASS INDEX: 27.45 KG/M2 | HEIGHT: 67 IN | DIASTOLIC BLOOD PRESSURE: 88 MMHG | OXYGEN SATURATION: 97 % | TEMPERATURE: 97.8 F | RESPIRATION RATE: 16 BRPM

## 2024-01-04 DIAGNOSIS — C41.9 MALIGNANT NEOPLASM OF BONE AND ARTICULAR CARTILAGE (H): ICD-10-CM

## 2024-01-04 DIAGNOSIS — N52.02 CORPORO-VENOUS OCCLUSIVE ERECTILE DYSFUNCTION: ICD-10-CM

## 2024-01-04 DIAGNOSIS — M67.40 GANGLION OF JOINT: ICD-10-CM

## 2024-01-04 DIAGNOSIS — Z00.00 ROUTINE GENERAL MEDICAL EXAMINATION AT A HEALTH CARE FACILITY: Primary | ICD-10-CM

## 2024-01-04 DIAGNOSIS — M46.1 SACROILIITIS (H): ICD-10-CM

## 2024-01-04 DIAGNOSIS — E78.5 HYPERLIPIDEMIA WITH TARGET LDL LESS THAN 130: ICD-10-CM

## 2024-01-04 DIAGNOSIS — I10 ESSENTIAL HYPERTENSION WITH GOAL BLOOD PRESSURE LESS THAN 140/90: ICD-10-CM

## 2024-01-04 LAB
ANION GAP SERPL CALCULATED.3IONS-SCNC: 11 MMOL/L (ref 7–15)
BUN SERPL-MCNC: 21.4 MG/DL (ref 8–23)
CALCIUM SERPL-MCNC: 10.2 MG/DL (ref 8.8–10.2)
CHLORIDE SERPL-SCNC: 104 MMOL/L (ref 98–107)
CHOLEST SERPL-MCNC: 226 MG/DL
CREAT SERPL-MCNC: 0.71 MG/DL (ref 0.67–1.17)
DEPRECATED HCO3 PLAS-SCNC: 27 MMOL/L (ref 22–29)
EGFRCR SERPLBLD CKD-EPI 2021: >90 ML/MIN/1.73M2
FASTING STATUS PATIENT QL REPORTED: ABNORMAL
GLUCOSE SERPL-MCNC: 113 MG/DL (ref 70–99)
HDLC SERPL-MCNC: 44 MG/DL
LDLC SERPL CALC-MCNC: 135 MG/DL
NONHDLC SERPL-MCNC: 182 MG/DL
POTASSIUM SERPL-SCNC: 3.9 MMOL/L (ref 3.4–5.3)
SODIUM SERPL-SCNC: 142 MMOL/L (ref 135–145)
TRIGL SERPL-MCNC: 233 MG/DL

## 2024-01-04 PROCEDURE — 80048 BASIC METABOLIC PNL TOTAL CA: CPT | Performed by: FAMILY MEDICINE

## 2024-01-04 PROCEDURE — 99396 PREV VISIT EST AGE 40-64: CPT | Performed by: FAMILY MEDICINE

## 2024-01-04 PROCEDURE — 91320 SARSCV2 VAC 30MCG TRS-SUC IM: CPT | Performed by: FAMILY MEDICINE

## 2024-01-04 PROCEDURE — 80061 LIPID PANEL: CPT | Performed by: FAMILY MEDICINE

## 2024-01-04 PROCEDURE — 90480 ADMN SARSCOV2 VAC 1/ONLY CMP: CPT | Performed by: FAMILY MEDICINE

## 2024-01-04 PROCEDURE — 36415 COLL VENOUS BLD VENIPUNCTURE: CPT | Performed by: FAMILY MEDICINE

## 2024-01-04 RX ORDER — SILDENAFIL CITRATE 20 MG/1
TABLET ORAL
Qty: 90 TABLET | Refills: 3 | Status: SHIPPED | OUTPATIENT
Start: 2024-01-04

## 2024-01-04 ASSESSMENT — ENCOUNTER SYMPTOMS
NERVOUS/ANXIOUS: 0
FREQUENCY: 0
WEAKNESS: 0
SORE THROAT: 0
EYE PAIN: 0
PARESTHESIAS: 0
PALPITATIONS: 0
CHILLS: 0
HEADACHES: 0
ARTHRALGIAS: 1
HEMATOCHEZIA: 0
DIARRHEA: 0
HEARTBURN: 0
CONSTIPATION: 0
FEVER: 0
DIZZINESS: 0
SHORTNESS OF BREATH: 0
ABDOMINAL PAIN: 0
JOINT SWELLING: 1
HEMATURIA: 0
COUGH: 0
NAUSEA: 0
MYALGIAS: 0
DYSURIA: 0

## 2024-01-04 ASSESSMENT — PAIN SCALES - GENERAL: PAINLEVEL: NO PAIN (0)

## 2024-01-04 NOTE — LETTER
My Asthma Action Plan    Name: Anthony De La Torre   YOB: 1965  Date: 1/4/2024   My doctor: Stanley Leroy MD   My clinic: [facility]        My Rescue Medicine:   Albuterol inhaler (Proair/Ventolin/Proventil HFA)  2-4 puffs EVERY 4 HOURS as needed. Use a spacer if recommended by your provider.   My Asthma Severity:   Intermittent / Exercise Induced  Know your asthma triggers: upper respiratory infections             GREEN ZONE   Good Control  I feel good  No cough or wheeze  Can work, sleep and play without asthma symptoms       Take your asthma control medicine every day.     If exercise triggers your asthma, take your rescue medication  15 minutes before exercise or sports, and  During exercise if you have asthma symptoms  Spacer to use with inhaler: If you have a spacer, make sure to use it with your inhaler             YELLOW ZONE Getting Worse  I have ANY of these:  I do not feel good  Cough or wheeze  Chest feels tight  Wake up at night   Keep taking your Green Zone medications  Start taking your rescue medicine:  every 20 minutes for up to 1 hour. Then every 4 hours for 24-48 hours.  If you stay in the Yellow Zone for more than 12-24 hours, contact your doctor.  If you do not return to the Green Zone in 12-24 hours or you get worse, start taking your oral steroid medicine if prescribed by your provider.           RED ZONE Medical Alert - Get Help  I have ANY of these:  I feel awful  Medicine is not helping  Breathing getting harder  Trouble walking or talking  Nose opens wide to breathe       Take your rescue medicine NOW  If your provider has prescribed an oral steroid medicine, start taking it NOW  Call your doctor NOW  If you are still in the Red Zone after 20 minutes and you have not reached your doctor:  Take your rescue medicine again and  Call 911 or go to the emergency room right away    See your regular doctor within 2 weeks of an Emergency Room or Urgent Care visit for  follow-up treatment.          Annual Reminders:  Meet with Asthma Educator,  Flu Shot in the Fall, consider Pneumonia Vaccination for patients with asthma (aged 19 and older).    Pharmacy:    [facility] [place] [place] - [address]  [facility] - [address]  [facility] - [address]    Electronically signed by Stanley Leroy MD   Date: 01/04/24                    Asthma Triggers  How To Control Things That Make Your Asthma Worse    Triggers are things that make your asthma worse.  Look at the list below to help you find your triggers and   what you can do about them. You can help prevent asthma flare-ups by staying away from your triggers.      Trigger                                                          What you can do   Cigarette Smoke  Tobacco smoke can make asthma worse. Do not allow smoking in your home, car or around you.  Be sure no one smokes at a child s day care or school.  If you smoke, ask your health care provider for ways to help you quit.  Ask family members to quit too.  Ask your health care provider for a referral to Quit Plan to help you quit smoking, or call 3-481-792-PLAN.     Colds, Flu, Bronchitis  These are common triggers of asthma. Wash your hands often.  Don t touch your eyes, nose or mouth.  Get a flu shot every year.     Dust Mites  These are tiny bugs that live in cloth or carpet. They are too small to see. Wash sheets and blankets in hot water every week.   Encase pillows and mattress in dust mite proof covers.  Avoid having carpet if you can. If you have carpet, vacuum weekly.   Use a dust mask and HEPA vacuum.   Pollen and Outdoor Mold  Some people are allergic to trees, grass, or weed pollen, or molds. Try to keep your windows closed.  Limit time out doors when pollen count is high.   Ask you health care provider about taking medicine during allergy season.      Animal Dander  Some people are allergic to skin flakes, urine or saliva from pets with fur or feathers. Keep pets with fur or feathers out of your home.    If you can t keep the pet outdoors, then keep the pet out of your bedroom.  Keep the bedroom door closed.  Keep pets off cloth furniture and away from stuffed toys.     Mice, Rats, and Cockroaches  Some people are allergic to the waste from these pests.   Cover food and garbage.  Clean up spills and food crumbs.  Store grease in the refrigerator.   Keep food out of the bedroom.   Indoor Mold  This can be a trigger if your home has high moisture. Fix leaking faucets, pipes, or other sources of water.   Clean moldy surfaces.  Dehumidify basement if it is damp and smelly.   Smoke, Strong Odors, and Sprays  These can reduce air quality. Stay away from strong odors and sprays, such as perfume, powder, hair spray, paints, smoke incense, paint, cleaning products, candles and new carpet.   Exercise or Sports  Some people with asthma have this trigger. Be active!  Ask your doctor about taking medicine before sports or exercise to prevent symptoms.    Warm up for 5-10 minutes before and after sports or exercise.     Other Triggers of Asthma  Cold air:  Cover your nose and mouth with a scarf.  Sometimes laughing or crying can be a trigger.  Some medicines and food can trigger asthma.

## 2024-01-04 NOTE — PROGRESS NOTES
SUBJECTIVE:   Robert is a 58 year old, presenting for the following:  Physical        1/4/2024     7:50 AM   Additional Questions   Roomed by Stella Real CMA       Healthy Habits:     Getting at least 3 servings of Calcium per day:  Yes    Bi-annual eye exam:  Yes    Dental care twice a year:  Yes    Sleep apnea or symptoms of sleep apnea:  None    Diet:  Regular (no restrictions)    Frequency of exercise:  2-3 days/week    Duration of exercise:  30-45 minutes    Taking medications regularly:  Yes    Medication side effects:  Not applicable    Additional concerns today:  No          Social History     Tobacco Use    Smoking status: Every Day     Packs/day: 1.00     Years: 20.00     Additional pack years: 0.00     Total pack years: 20.00     Types: Cigarettes    Smokeless tobacco: Never    Tobacco comments:     1.00 ppd or less   Substance Use Topics    Alcohol use: Yes     Comment: Occasional - 6-7 drinks per week             1/3/2024     4:16 PM   Alcohol Use   Prescreen: >3 drinks/day or >7 drinks/week? No          No data to display                Last PSA:   PSA   Date Value Ref Range Status   06/19/2019 0.38 0 - 4 ug/L Final     Comment:     Assay Method:  Chemiluminescence using Siemens Vista analyzer     Prostate Specific Antigen Screen   Date Value Ref Range Status   10/28/2021 0.45 0.00 - 4.00 ug/L Final       Reviewed orders with patient. Reviewed health maintenance and updated orders accordingly - Yes      Reviewed and updated as needed this visit by clinical staff   Tobacco  Allergies  Meds   Med Hx  Surg Hx  Fam Hx  Soc Hx        Reviewed and updated as needed this visit by Provider                     Review of Systems   Constitutional:  Negative for chills and fever.   HENT:  Positive for hearing loss. Negative for congestion, ear pain and sore throat.    Eyes:  Positive for visual disturbance. Negative for pain.   Respiratory:  Negative for cough and shortness of breath.    Cardiovascular:   "Negative for chest pain, palpitations and peripheral edema.   Gastrointestinal:  Negative for abdominal pain, constipation, diarrhea, heartburn, hematochezia and nausea.   Genitourinary:  Positive for impotence. Negative for dysuria, frequency, genital sores, hematuria, penile discharge and urgency.   Musculoskeletal:  Positive for arthralgias and joint swelling. Negative for myalgias.   Skin:  Negative for rash.   Neurological:  Negative for dizziness, weakness, headaches and paresthesias.   Psychiatric/Behavioral:  Negative for mood changes. The patient is not nervous/anxious.      CONSTITUTIONAL: NEGATIVE for fever, chills, change in weight  INTEGUMENTARY/SKIN: NEGATIVE for worrisome rashes, moles or lesions  EYES: NEGATIVE for vision changes or irritation  ENT: NEGATIVE for ear, mouth and throat problems  RESP: NEGATIVE for significant cough or SOB  CV: NEGATIVE for chest pain, palpitations or peripheral edema  GI: NEGATIVE for nausea, abdominal pain, heartburn, or change in bowel habits   male: negative for dysuria, hematuria, decreased urinary stream, erectile dysfunction, urethral discharge  MUSCULOSKELETAL: NEGATIVE for significant arthralgias or myalgia  NEURO: NEGATIVE for weakness, dizziness or paresthesias  PSYCHIATRIC: NEGATIVE for changes in mood or affect    OBJECTIVE:   /88   Pulse 91   Temp 97.8  F (36.6  C) (Tympanic)   Resp 16   Ht 1.709 m (5' 7.3\")   Wt 79.3 kg (174 lb 14.4 oz)   SpO2 97%   BMI 27.15 kg/m      Physical Exam  GENERAL: healthy, alert and no distress  NECK: no adenopathy, no asymmetry, masses, or scars and thyroid normal to palpation  RESP: lungs clear to auscultation - no rales, rhonchi or wheezes  CV: regular rate and rhythm, normal S1 S2, no S3 or S4, no murmur, click or rub, no peripheral edema and peripheral pulses strong  ABDOMEN: soft, nontender, no hepatosplenomegaly, no masses and bowel sounds normal  MS: no gross musculoskeletal defects noted, no " edema      ASSESSMENT/PLAN:   (Z00.00) Routine general medical examination at a health care facility  (primary encounter diagnosis)    (I10) Essential hypertension with goal blood pressure less than 140/90  Good control.  Continue currant medications, continue to monitor.      (E78.5) Hyperlipidemia with target LDL less than 130  Has pain with statin take once or twice a week     (M46.1) Sacroiliitis (H24)  Good control.  Continue currant medications, continue to monitor.        COUNSELING:   Reviewed preventive health counseling, as reflected in patient instructions       Regular exercise       Healthy diet/nutrition       Vision screening       Hearing screening       Colorectal cancer screening       Prostate cancer screening        He reports that he has been smoking cigarettes. He has a 20 pack-year smoking history. He has never used smokeless tobacco.  Nicotine/Tobacco Cessation Plan:   Information offered: Patient not interested at this time            Stanley Leroy MD  [facility]

## 2024-02-26 ENCOUNTER — ANCILLARY PROCEDURE (OUTPATIENT)
Dept: GENERAL RADIOLOGY | Facility: CLINIC | Age: 59
End: 2024-02-26
Attending: FAMILY MEDICINE
Payer: COMMERCIAL

## 2024-02-26 ENCOUNTER — OFFICE VISIT (OUTPATIENT)
Dept: ORTHOPEDICS | Facility: CLINIC | Age: 59
End: 2024-02-26
Payer: COMMERCIAL

## 2024-02-26 VITALS
BODY MASS INDEX: 27.01 KG/M2 | DIASTOLIC BLOOD PRESSURE: 98 MMHG | WEIGHT: 174 LBS | SYSTOLIC BLOOD PRESSURE: 161 MMHG | HEART RATE: 77 BPM

## 2024-02-26 DIAGNOSIS — M19.071 ARTHRITIS OF RIGHT FOOT: Primary | ICD-10-CM

## 2024-02-26 DIAGNOSIS — M79.671 RIGHT FOOT PAIN: ICD-10-CM

## 2024-02-26 PROCEDURE — 73630 X-RAY EXAM OF FOOT: CPT | Mod: TC | Performed by: RADIOLOGY

## 2024-02-26 PROCEDURE — 99204 OFFICE O/P NEW MOD 45 MIN: CPT | Mod: 25 | Performed by: FAMILY MEDICINE

## 2024-02-26 PROCEDURE — 20604 DRAIN/INJ JOINT/BURSA W/US: CPT | Mod: RT | Performed by: FAMILY MEDICINE

## 2024-02-26 RX ORDER — ROPIVACAINE HYDROCHLORIDE 5 MG/ML
1 INJECTION, SOLUTION EPIDURAL; INFILTRATION; PERINEURAL
Status: SHIPPED | OUTPATIENT
Start: 2024-02-26

## 2024-02-26 RX ORDER — BETAMETHASONE SODIUM PHOSPHATE AND BETAMETHASONE ACETATE 3; 3 MG/ML; MG/ML
12 INJECTION, SUSPENSION INTRA-ARTICULAR; INTRALESIONAL; INTRAMUSCULAR; SOFT TISSUE
Status: SHIPPED | OUTPATIENT
Start: 2024-02-26

## 2024-02-26 RX ORDER — BETAMETHASONE SODIUM PHOSPHATE AND BETAMETHASONE ACETATE 3; 3 MG/ML; MG/ML
6 INJECTION, SUSPENSION INTRA-ARTICULAR; INTRALESIONAL; INTRAMUSCULAR; SOFT TISSUE
Status: SHIPPED | OUTPATIENT
Start: 2024-02-26

## 2024-02-26 RX ADMIN — ROPIVACAINE HYDROCHLORIDE 1 ML: 5 INJECTION, SOLUTION EPIDURAL; INFILTRATION; PERINEURAL at 08:17

## 2024-02-26 RX ADMIN — BETAMETHASONE SODIUM PHOSPHATE AND BETAMETHASONE ACETATE 12 MG: 3; 3 INJECTION, SUSPENSION INTRA-ARTICULAR; INTRALESIONAL; INTRAMUSCULAR; SOFT TISSUE at 08:17

## 2024-02-26 RX ADMIN — BETAMETHASONE SODIUM PHOSPHATE AND BETAMETHASONE ACETATE 6 MG: 3; 3 INJECTION, SUSPENSION INTRA-ARTICULAR; INTRALESIONAL; INTRAMUSCULAR; SOFT TISSUE at 08:17

## 2024-02-26 NOTE — PATIENT INSTRUCTIONS
# Right Foot Arthritis: Anthony De La Torre  was seen today for right foot pain. Symptoms had been going on for several years, worsening over the past few months. On examination there are positive findings of tenderness to palpation over the talonavicular joint. Imaging findings showed midfoot arthritis. Likely cause of patient's condition due to flare of midfoot arthritis. Counseled patient on nature of condition and treatment options.  Given this plan as below, follow-up 3 mon if not improving, sooner if worsening     Image Findings: midfoot arthritis, worse at the talonavicular joint  Treatment: Activities as tolerated, home exercises given today, carbon fiber plate  Medications/Injections: Limited tylenol/ibuprofen for pain for 1-2 weeks, Topical Voltaren gel, right talonavicular joint steroid injection  Follow-up: In one month if symptoms do not improve, sooner if worsening  Can consider repeat injection    Please call [phone]   Ask for my team if you have any questions or concerns    If you have not yet received the influenza vaccine but would like to get one, please call  [phone] or you can schedule via [employer]    It was great seeing you today!    Armand Elizabeth MD, CAQSM     Carbon Fiber Insole for Men & Women 1 PC Carbon Fiber Foot Plate, Carbon Fiber Shoe Insert-          FSOC Injection Discharge Instructions    Procedure: right talonavicular joint steroid injection    You may shower, however avoid swimming, tub baths or hot tubs for 24 hours following your procedure  You may have a mild to moderate increase in pain for several days following the injection.  It may take up to 14 days for the steroid medication to start working although you may feel the effect as early as a few days after the procedure.  You may use ice packs for 10-15 minutes, 3 to 4 times a day at the injection site for comfort  You may use anti-inflammatory medications (such as Ibuprofen or Aleve or Advil) or Tylenol for pain  control if necessary  If you were fasting, you may resume your normal diet and medications after the procedure  If you have diabetes, check your blood sugar more frequently than usual as your blood sugar may be higher than normal for 10-14 days following a steroid injection. Contact your doctor who manages your diabetes if your blood sugar is higher than usual    If you experience any of the following, call [facility] @ [phone] or [phone]  -Fever over 100 degree F  -Swelling, bleeding, redness, drainage, warmth at the injection site  - New or worsening pain

## 2024-02-26 NOTE — PROGRESS NOTES
ASSESSMENT & PLAN    Robert was seen today for follow up.    Diagnoses and all orders for this visit:    Arthritis of right foot  -     XR Foot Right G/E 3 Views; Future  -     Cancel: Medium Joint Injection/Arthrocentesis: R ankle  -     Small Joint Injection/Arthrocentesis: R intertarsal    Right foot pain  -     XR Foot Right G/E 3 Views; Future  -     Cancel: Medium Joint Injection/Arthrocentesis: R ankle  -     Small Joint Injection/Arthrocentesis: R intertarsal    Other orders  -     Cancel: Medium Joint Injection/Arthrocentesis  -     Cancel: Small Joint Injection/Arthrocentesis  -     Cancel: Medium Joint Injection/Arthrocentesis  -     Cancel: Medium Joint Injection/Arthrocentesis  -     Cancel: Large Joint Injection/Arthocentesis        # Right Foot Arthritis: Anthony De La Torre  was seen today for right foot pain. Symptoms had been going on for several years, worsening over the past few months. On examination there are positive findings of tenderness to palpation over the talonavicular joint. Imaging findings showed midfoot arthritis. Likely cause of patient's condition due to flare of midfoot arthritis. Counseled patient on nature of condition and treatment options.  Given this plan as below, follow-up 3 mon if not improving, sooner if worsening     Image Findings: midfoot arthritis, worse at the talonavicular joint  Treatment: Activities as tolerated, home exercises given today, carbon fiber plate  Medications/Injections: Limited tylenol/ibuprofen for pain for 1-2 weeks, Topical Voltaren gel, right talonavicular joint steroid injection  Follow-up: In one month if symptoms do not improve, sooner if worsening  Can consider repeat injection    -----    SUBJECTIVE:  Anthony De La Torre is a 58 year old male who is seen in follow-up for right foot pain. They were last seen 7/14/2020.  The patient is seen by themselves.    Since their last visit reports that the cyst has grown back. Painful with walking.  They indicate  that their current pain level is 4/10. Treatment depends on what he is doing. Does take tylenol, ibuprofen prn.        Patient's past medical, surgical, social, and family histories were reviewed today and no changes are noted.    REVIEW OF SYSTEMS:  Constitutional: NEGATIVE for fever, chills, change in weight  Skin: NEGATIVE for worrisome rashes, moles or lesions  GI/: NEGATIVE for bowel or bladder changes  Neuro: NEGATIVE for weakness, dizziness or paresthesias    OBJECTIVE:  BP (!) 161/98   Pulse 77   Wt 78.9 kg (174 lb)   BMI 27.01 kg/m     General: healthy, alert and in no distress  HEENT: no scleral icterus or conjunctival erythema  Skin: no suspicious lesions or rash. No jaundice.  CV: regular rhythm by palpation, no pedal edema  Resp: normal respiratory effort without conversational dyspnea   Psych: normal mood and affect  Gait: normal steady gait with appropriate coordination and balance  Neuro: normal light touch sensory exam of the extremities.    MSK:    RIGHT FOOT  Inspection:    no swelling   Palpation:    Tender about the talonavicular joint    No tenderness over the proximal 5th metatarsal, cuboid, or Lisfranc joint  Range of Motion:     Active toe flexion and extension  - intact    Active ankle range of motion - intact    Passive ankle range of motion - intact  Strength:    Intrinsic foot musculature strength - intact    Ankle strength - intact  Special Tests:    Positive: None    Negative: calcaneal squeeze and Lisfranc joint tenderness    Independent visualization of the below image:    Talonavicular foot arthritis, no fracture    Armand Clara MCCONNELL, [facility]    Disclaimer: This note consists of symbols derived from keyboarding, dictation and/or voice recognition software. As a result, there may be errors in the script that have gone undetected. Please consider this when interpreting information found in this chart.    Small Joint Injection/Arthrocentesis: R  intertarsal    Date/Time: 2/26/2024 8:17 AM    Performed by: Armand Elizabeth MD  Authorized by: Armand Elizabeth MD  Indications:  Pain  Needle Size:  25 G  Guidance: ultrasound     Approach:  Dorsal  Location:  Foot    Site:  R intertarsal                  Medications:  1 mL ROPivacaine 5 MG/ML; 12 mg betamethasone acet & sod phos 6 (3-3) MG/ML; 6 mg betamethasone acet & sod phos 6 (3-3) MG/ML            Consent Given by:  Patient  Timeout: timeout called immediately prior to procedure    Prep: patient was prepped and draped in usual sterile fashion       Patient reported significant improvement of pain after the numbing portion right foot talonavicular joint steroid injection.  Ultrasound guided images were permanently stored.   Aftercare instructions given to patient.  Plan to follow-up as discussed above.     Armand Elizabeth MD [facility]

## 2024-02-26 NOTE — LETTER
2/26/2024         RE: Anthony De La Torre  [address] [facility] [address]        Dear Colleague,    Thank you for referring your patient, Anthony De La Torre, to the [facility] [place]. Please see a copy of my visit note below.    ASSESSMENT & PLAN    Robert was seen today for follow up.    Diagnoses and all orders for this visit:    Arthritis of right foot  -     XR Foot Right G/E 3 Views; Future  -     Cancel: Medium Joint Injection/Arthrocentesis: R ankle  -     Small Joint Injection/Arthrocentesis: R intertarsal    Right foot pain  -     XR Foot Right G/E 3 Views; Future  -     Cancel: Medium Joint Injection/Arthrocentesis: R ankle  -     Small Joint Injection/Arthrocentesis: R intertarsal    Other orders  -     Cancel: Medium Joint Injection/Arthrocentesis  -     Cancel: Small Joint Injection/Arthrocentesis  -     Cancel: Medium Joint Injection/Arthrocentesis  -     Cancel: Medium Joint Injection/Arthrocentesis  -     Cancel: Large Joint Injection/Arthocentesis        # Right Foot Arthritis: Anthony De La Torre  was seen today for right foot pain. Symptoms had been going on for several years, worsening over the past few months. On examination there are positive findings of tenderness to palpation over the talonavicular joint. Imaging findings showed midfoot arthritis. Likely cause of patient's condition due to flare of midfoot arthritis. Counseled patient on nature of condition and treatment options.  Given this plan as below, follow-up 3 mon if not improving, sooner if worsening     Image Findings: midfoot arthritis, worse at the talonavicular joint  Treatment: Activities as tolerated, home exercises given today, carbon fiber plate  Medications/Injections: Limited tylenol/ibuprofen for pain for 1-2 weeks, Topical Voltaren gel, right talonavicular joint steroid injection  Follow-up: In one month if symptoms do not improve, sooner if worsening  Can consider repeat  injection    -----    SUBJECTIVE:  Anthony De La Torre is a 58 year old male who is seen in follow-up for right foot pain. They were last seen 7/14/2020.  The patient is seen by themselves.    Since their last visit reports that the cyst has grown back. Painful with walking.  They indicate that their current pain level is 4/10. Treatment depends on what he is doing. Does take tylenol, ibuprofen prn.        Patient's past medical, surgical, social, and family histories were reviewed today and no changes are noted.    REVIEW OF SYSTEMS:  Constitutional: NEGATIVE for fever, chills, change in weight  Skin: NEGATIVE for worrisome rashes, moles or lesions  GI/: NEGATIVE for bowel or bladder changes  Neuro: NEGATIVE for weakness, dizziness or paresthesias    OBJECTIVE:  BP (!) 161/98   Pulse 77   Wt 78.9 kg (174 lb)   BMI 27.01 kg/m     General: healthy, alert and in no distress  HEENT: no scleral icterus or conjunctival erythema  Skin: no suspicious lesions or rash. No jaundice.  CV: regular rhythm by palpation, no pedal edema  Resp: normal respiratory effort without conversational dyspnea   Psych: normal mood and affect  Gait: normal steady gait with appropriate coordination and balance  Neuro: normal light touch sensory exam of the extremities.    MSK:    RIGHT FOOT  Inspection:    no swelling   Palpation:    Tender about the talonavicular joint    No tenderness over the proximal 5th metatarsal, cuboid, or Lisfranc joint  Range of Motion:     Active toe flexion and extension  - intact    Active ankle range of motion - intact    Passive ankle range of motion - intact  Strength:    Intrinsic foot musculature strength - intact    Ankle strength - intact  Special Tests:    Positive: None    Negative: calcaneal squeeze and Lisfranc joint tenderness    Independent visualization of the below image:    Talonavicular foot arthritis, no fracture    Armand Elizabeth MD, [facility]    Disclaimer: This  note consists of symbols derived from keyboarding, dictation and/or voice recognition software. As a result, there may be errors in the script that have gone undetected. Please consider this when interpreting information found in this chart.    Small Joint Injection/Arthrocentesis: R intertarsal    Date/Time: 2/26/2024 8:17 AM    Performed by: Armand Elizabeth MD  Authorized by: Armand Elizabeth MD  Indications:  Pain  Needle Size:  25 G  Guidance: ultrasound     Approach:  Dorsal  Location:  Foot    Site:  R intertarsal                  Medications:  1 mL ROPivacaine 5 MG/ML; 12 mg betamethasone acet & sod phos 6 (3-3) MG/ML; 6 mg betamethasone acet & sod phos 6 (3-3) MG/ML            Consent Given by:  Patient  Timeout: timeout called immediately prior to procedure    Prep: patient was prepped and draped in usual sterile fashion       Patient reported significant improvement of pain after the numbing portion right foot talonavicular joint steroid injection.  Ultrasound guided images were permanently stored.   Aftercare instructions given to patient.  Plan to follow-up as discussed above.     Armand Elizabeth MD [facility]             Again, thank you for allowing me to participate in the care of your patient.        Sincerely,        Armand Elizabeth MD

## 2024-06-18 DIAGNOSIS — I10 ESSENTIAL HYPERTENSION WITH GOAL BLOOD PRESSURE LESS THAN 140/90: ICD-10-CM

## 2024-06-18 RX ORDER — HYDROCHLOROTHIAZIDE 25 MG/1
TABLET ORAL
Qty: 90 TABLET | Refills: 3 | Status: SHIPPED | OUTPATIENT
Start: 2024-06-18

## 2024-07-17 DIAGNOSIS — I10 BENIGN ESSENTIAL HYPERTENSION: ICD-10-CM

## 2024-07-18 RX ORDER — AMLODIPINE BESYLATE 2.5 MG/1
TABLET ORAL
Qty: 90 TABLET | Refills: 3 | Status: SHIPPED | OUTPATIENT
Start: 2024-07-18

## 2024-08-26 ENCOUNTER — MYC MEDICAL ADVICE (OUTPATIENT)
Dept: FAMILY MEDICINE | Facility: CLINIC | Age: 59
End: 2024-08-26
Payer: COMMERCIAL

## 2024-08-26 ENCOUNTER — TELEPHONE (OUTPATIENT)
Dept: FAMILY MEDICINE | Facility: CLINIC | Age: 59
End: 2024-08-26
Payer: COMMERCIAL

## 2024-08-26 DIAGNOSIS — I10 ESSENTIAL HYPERTENSION WITH GOAL BLOOD PRESSURE LESS THAN 140/90: ICD-10-CM

## 2024-08-26 ASSESSMENT — ASTHMA QUESTIONNAIRES
QUESTION_4 LAST FOUR WEEKS HOW OFTEN HAVE YOU USED YOUR RESCUE INHALER OR NEBULIZER MEDICATION (SUCH AS ALBUTEROL): NOT AT ALL
QUESTION_5 LAST FOUR WEEKS HOW WOULD YOU RATE YOUR ASTHMA CONTROL: WELL CONTROLLED
QUESTION_3 LAST FOUR WEEKS HOW OFTEN DID YOUR ASTHMA SYMPTOMS (WHEEZING, COUGHING, SHORTNESS OF BREATH, CHEST TIGHTNESS OR PAIN) WAKE YOU UP AT NIGHT OR EARLIER THAN USUAL IN THE MORNING: NOT AT ALL
ACT_TOTALSCORE: 22
QUESTION_1 LAST FOUR WEEKS HOW MUCH OF THE TIME DID YOUR ASTHMA KEEP YOU FROM GETTING AS MUCH DONE AT WORK, SCHOOL OR AT HOME: A LITTLE OF THE TIME
QUESTION_2 LAST FOUR WEEKS HOW OFTEN HAVE YOU HAD SHORTNESS OF BREATH: ONCE OR TWICE A WEEK
ACT_TOTALSCORE: 22

## 2024-08-26 NOTE — TELEPHONE ENCOUNTER
Patient Quality Outreach    Patient is due for the following:   Asthma  -  ACT needed  Hypertension -  BP check      Topic Date Due    Zoster (Shingles) Vaccine (2 of 2) 11/04/2020       Next Steps:   Schedule a nurse only visit for blood pressure check. Also sent patient ACT through [email or web address].    Type of outreach:    Phone, left message for patient/parent to call back. and Sent [email or web address] message.    Next Steps:  Reach out within 90 days via [email or web address].    Max number of attempts reached: No. Will try again in 90 days if patient still on fail list.    Questions for provider review:    None           Stella Cole  Chart routed to Self.

## 2024-08-29 RX ORDER — ALBUTEROL SULFATE 90 UG/1
2 AEROSOL, METERED RESPIRATORY (INHALATION) EVERY 6 HOURS PRN
Qty: 18 G | Refills: 1 | Status: SHIPPED | OUTPATIENT
Start: 2024-08-29

## 2024-08-29 NOTE — TELEPHONE ENCOUNTER
Pending Prescriptions:                       Disp   Refills    albuterol (PROAIR HFA/PROVENTIL HFA/ESCOBAR*18 g   1            Sig: Inhale 2 puffs into the lungs every 6 hours as           needed for shortness of breath.    Routing refill request to provider for review/approval because:  Last filled 2017  Act was completed by pt      Bandar Crow RN

## 2024-10-29 DIAGNOSIS — I10 ESSENTIAL HYPERTENSION WITH GOAL BLOOD PRESSURE LESS THAN 140/90: ICD-10-CM

## 2024-10-30 RX ORDER — LOSARTAN POTASSIUM 100 MG/1
TABLET ORAL
Qty: 90 TABLET | Refills: 3 | Status: SHIPPED | OUTPATIENT
Start: 2024-10-30

## 2024-10-30 NOTE — TELEPHONE ENCOUNTER
Requested Prescriptions   Pending Prescriptions Disp Refills    losartan (COZAAR) 100 MG tablet [Pharmacy Med Name: LOSARTAN TABS 100MG] 90 tablet 3     Sig: TAKE 1 TABLET DAILY       Angiotensin-II Receptors Failed - 10/30/2024  2:25 PM        Failed - Most recent blood pressure under 140/90 in past 12 months     BP Readings from Last 3 Encounters:   02/26/24 (!) 161/98   01/04/24 138/88   08/23/23 138/82       No data recorded            Passed - Medication is active on med list        Passed - Has GFR on file in past 12 months and most recent value is normal        Passed - Medication indicated for associated diagnosis     The medication is prescribed for one or more of the following conditions:    Chronic Kidney Disease (CDK)    Heart Failure (HF)    Diabetes, Nephropathy   Hypertension    Coronary Artery Disease (CAD)   Raynaud's Disease   Ischemic cardiomyopathy   Cardiomyopathy   Pulmonary Hypertension          Passed - Recent (12 mo) or future (90days) visit within the authorizing provider's specialty     The patient must have completed an in-person or virtual visit within the past 12 months or has a future visit scheduled within the next 90 days with the authorizing provider s specialty.  Urgent care and e-visits do not quality as an office visit for this protocol.          Passed - Patient is age 18 or older        Passed - Normal serum potassium on file in past 12 months     Recent Labs   Lab Test 01/04/24  0853   POTASSIUM 3.9

## 2025-01-05 SDOH — HEALTH STABILITY: PHYSICAL HEALTH: ON AVERAGE, HOW MANY DAYS PER WEEK DO YOU ENGAGE IN MODERATE TO STRENUOUS EXERCISE (LIKE A BRISK WALK)?: 3 DAYS

## 2025-01-05 SDOH — HEALTH STABILITY: PHYSICAL HEALTH: ON AVERAGE, HOW MANY MINUTES DO YOU ENGAGE IN EXERCISE AT THIS LEVEL?: 30 MIN

## 2025-01-05 ASSESSMENT — SOCIAL DETERMINANTS OF HEALTH (SDOH): HOW OFTEN DO YOU GET TOGETHER WITH FRIENDS OR RELATIVES?: THREE TIMES A WEEK

## 2025-01-06 ENCOUNTER — OFFICE VISIT (OUTPATIENT)
Dept: FAMILY MEDICINE | Facility: CLINIC | Age: 60
End: 2025-01-06
Payer: COMMERCIAL

## 2025-01-06 VITALS
OXYGEN SATURATION: 96 % | TEMPERATURE: 97.6 F | HEART RATE: 90 BPM | HEIGHT: 67 IN | SYSTOLIC BLOOD PRESSURE: 128 MMHG | BODY MASS INDEX: 27.5 KG/M2 | WEIGHT: 175.2 LBS | DIASTOLIC BLOOD PRESSURE: 78 MMHG | RESPIRATION RATE: 16 BRPM

## 2025-01-06 DIAGNOSIS — Z12.5 SCREENING FOR PROSTATE CANCER: ICD-10-CM

## 2025-01-06 DIAGNOSIS — M79.644 THUMB PAIN, RIGHT: ICD-10-CM

## 2025-01-06 DIAGNOSIS — C41.9 MALIGNANT NEOPLASM OF BONE AND ARTICULAR CARTILAGE (H): ICD-10-CM

## 2025-01-06 DIAGNOSIS — J45.20 MILD INTERMITTENT ASTHMA WITHOUT COMPLICATION: ICD-10-CM

## 2025-01-06 DIAGNOSIS — I10 PRIMARY HYPERTENSION: ICD-10-CM

## 2025-01-06 DIAGNOSIS — E78.5 HYPERLIPIDEMIA WITH TARGET LDL LESS THAN 130: ICD-10-CM

## 2025-01-06 DIAGNOSIS — I10 ESSENTIAL HYPERTENSION WITH GOAL BLOOD PRESSURE LESS THAN 140/90: Primary | ICD-10-CM

## 2025-01-06 DIAGNOSIS — M25.531 ARTHRALGIA OF RIGHT WRIST: ICD-10-CM

## 2025-01-06 DIAGNOSIS — Z00.00 ROUTINE GENERAL MEDICAL EXAMINATION AT A HEALTH CARE FACILITY: ICD-10-CM

## 2025-01-06 LAB
ANION GAP SERPL CALCULATED.3IONS-SCNC: 14 MMOL/L (ref 7–15)
BUN SERPL-MCNC: 21.9 MG/DL (ref 8–23)
CALCIUM SERPL-MCNC: 10.3 MG/DL (ref 8.8–10.4)
CHLORIDE SERPL-SCNC: 102 MMOL/L (ref 98–107)
CHOLEST SERPL-MCNC: 226 MG/DL
CREAT SERPL-MCNC: 0.81 MG/DL (ref 0.67–1.17)
EGFRCR SERPLBLD CKD-EPI 2021: >90 ML/MIN/1.73M2
FASTING STATUS PATIENT QL REPORTED: YES
FASTING STATUS PATIENT QL REPORTED: YES
GLUCOSE SERPL-MCNC: 112 MG/DL (ref 70–99)
HCO3 SERPL-SCNC: 25 MMOL/L (ref 22–29)
HDLC SERPL-MCNC: 43 MG/DL
LDLC SERPL CALC-MCNC: 141 MG/DL
NONHDLC SERPL-MCNC: 183 MG/DL
POTASSIUM SERPL-SCNC: 4 MMOL/L (ref 3.4–5.3)
PSA SERPL DL<=0.01 NG/ML-MCNC: 0.42 NG/ML (ref 0–4.5)
SODIUM SERPL-SCNC: 141 MMOL/L (ref 135–145)
TRIGL SERPL-MCNC: 209 MG/DL

## 2025-01-06 PROCEDURE — 80048 BASIC METABOLIC PNL TOTAL CA: CPT | Performed by: FAMILY MEDICINE

## 2025-01-06 PROCEDURE — 36415 COLL VENOUS BLD VENIPUNCTURE: CPT | Performed by: FAMILY MEDICINE

## 2025-01-06 PROCEDURE — 80061 LIPID PANEL: CPT | Performed by: FAMILY MEDICINE

## 2025-01-06 PROCEDURE — 90750 HZV VACC RECOMBINANT IM: CPT | Performed by: FAMILY MEDICINE

## 2025-01-06 PROCEDURE — 90471 IMMUNIZATION ADMIN: CPT | Performed by: FAMILY MEDICINE

## 2025-01-06 PROCEDURE — 99396 PREV VISIT EST AGE 40-64: CPT | Mod: 25 | Performed by: FAMILY MEDICINE

## 2025-01-06 PROCEDURE — G0103 PSA SCREENING: HCPCS | Performed by: FAMILY MEDICINE

## 2025-01-06 ASSESSMENT — PAIN SCALES - GENERAL: PAINLEVEL_OUTOF10: MODERATE PAIN (4)

## 2025-01-06 NOTE — PATIENT INSTRUCTIONS
Patient Education   Preventive Care Advice   This is general advice given by our system to help you stay healthy. However, your care team may have specific advice just for you. Please talk to your care team about your preventive care needs.  Nutrition  Eat 5 or more servings of fruits and vegetables each day.  Try wheat bread, brown rice and whole grain pasta (instead of white bread, rice, and pasta).  Get enough calcium and vitamin D. Check the label on foods and aim for 100% of the RDA (recommended daily allowance).  Lifestyle  Exercise at least 150 minutes each week  (30 minutes a day, 5 days a week).  Do muscle strengthening activities 2 days a week. These help control your weight and prevent disease.  No smoking.  Wear sunscreen to prevent skin cancer.  Have a dental exam and cleaning every 6 months.  Yearly exams  See your health care team every year to talk about:  Any changes in your health.  Any medicines your care team has prescribed.  Preventive care, family planning, and ways to prevent chronic diseases.  Shots (vaccines)   HPV shots (up to age 26), if you've never had them before.  Hepatitis B shots (up to age 59), if you've never had them before.  COVID-19 shot: Get this shot when it's due.  Flu shot: Get a flu shot every year.  Tetanus shot: Get a tetanus shot every 10 years.  Pneumococcal, hepatitis A, and RSV shots: Ask your care team if you need these based on your risk.  Shingles shot (for age 50 and up)  General health tests  Diabetes screening:  Starting at age 35, Get screened for diabetes at least every 3 years.  If you are younger than age 35, ask your care team if you should be screened for diabetes.  Cholesterol test: At age 39, start having a cholesterol test every 5 years, or more often if advised.  Bone density scan (DEXA): At age 50, ask your care team if you should have this scan for osteoporosis (brittle bones).  Hepatitis C: Get tested at least once in your life.  STIs (sexually  transmitted infections)  Before age 24: Ask your care team if you should be screened for STIs.  After age 24: Get screened for STIs if you're at risk. You are at risk for STIs (including HIV) if:  You are sexually active with more than one person.  You don't use condoms every time.  You or a partner was diagnosed with a sexually transmitted infection.  If you are at risk for HIV, ask about PrEP medicine to prevent HIV.  Get tested for HIV at least once in your life, whether you are at risk for HIV or not.  Cancer screening tests  Cervical cancer screening: If you have a cervix, begin getting regular cervical cancer screening tests starting at age 21.  Breast cancer scan (mammogram): If you've ever had breasts, begin having regular mammograms starting at age 40. This is a scan to check for breast cancer.  Colon cancer screening: It is important to start screening for colon cancer at age 45.  Have a colonoscopy test every 10 years (or more often if you're at risk) Or, ask your provider about stool tests like a FIT test every year or Cologuard test every 3 years.  To learn more about your testing options, visit:   .  For help making a decision, visit:   https://bit.ly/wy77438.  Prostate cancer screening test: If you have a prostate, ask your care team if a prostate cancer screening test (PSA) at age 55 is right for you.  Lung cancer screening: If you are a current or former smoker ages 50 to 80, ask your care team if ongoing lung cancer screenings are right for you.  For informational purposes only. Not to replace the advice of your health care provider. Copyright   2023 [employer]. All rights reserved. Clinically reviewed by the [facility] Transitions Program. Match[employer] [id number] - REV 01/24.

## 2025-01-06 NOTE — PROGRESS NOTES
"Preventive Care Visit  [facility] ISA Leroy MD, Family Practice  Jan 6, 2025  {Provider  Link to [facility] :[id number]}    Assessment & Plan     Essential hypertension with goal blood pressure less than 140/90      Hyperlipidemia with target LDL less than 130  Routine general medical examination at a health care facility      Primary hypertension  Good control.  Continue currant medications, continue to monitor.      Mild intermittent asthma without complication      Patient has been advised of split billing requirements and indicates understanding: Yes        Nicotine/Tobacco Cessation  He reports that he has been smoking cigarettes. He has a 20 pack-year smoking history. He has never used smokeless tobacco.  Nicotine/Tobacco Cessation Plan      BMI  Estimated body mass index is 27.2 kg/m  as calculated from the following:    Height as of this encounter: 1.709 m (5' 7.3\").    Weight as of this encounter: 79.5 kg (175 lb 3.2 oz).   Weight management plan: Discussed healthy diet and exercise guidelines    Counseling  Appropriate preventive services were addressed with this patient via screening, questionnaire, or discussion as appropriate for fall prevention, nutrition, physical activity, Tobacco-use cessation, social engagement, weight loss and cognition.  Checklist reviewing preventive services available has been given to the patient.  Reviewed patient's diet, addressing concerns and/or questions.   He is at risk for lack of exercise and has been provided with information to increase physical activity for the benefit of his well-being.       Tatiana Jones is a 59 year old, presenting for the following:  Physical        1/6/2025     8:26 AM   Additional Questions   Roomed by Stella Cole CMA   Accompanied by Self          HPI    -He is having pain in his right foot that he is wanting to discuss.      Health Care Directive  Patient does not have a Health Care Directive: Discussed advance " care planning with patient; however, patient declined at this time.        1/5/2025   General Health   How would you rate your overall physical health? (!) FAIR   Feel stress (tense, anxious, or unable to sleep) Only a little   (!) STRESS CONCERN      1/5/2025   Nutrition   Three or more servings of calcium each day? Yes   Diet: Regular (no restrictions)    Low salt   How many servings of fruit and vegetables per day? (!) 2-3   How many sweetened beverages each day? 0-1       Multiple values from one day are sorted in reverse-chronological order         1/5/2025   Exercise   Days per week of moderate/strenous exercise 3 days   Average minutes spent exercising at this level 30 min         1/5/2025   Social Factors   Frequency of gathering with friends or relatives Three times a week   Worry food won't last until get money to buy more No   Food not last or not have enough money for food? No   Do you have housing? (Housing is defined as stable permanent housing and does not include staying ouside in a car, in a tent, in an abandoned building, in an overnight shelter, or couch-surfing.) Yes   Are you worried about losing your housing? No   Lack of transportation? No   Unable to get utilities (heat,electricity)? No         1/5/2025   Fall Risk   Fallen 2 or more times in the past year? No   Trouble with walking or balance? No          1/5/2025   Dental   Dentist two times every year? Yes         1/5/2025   TB Screening   Were you born outside of the [place]? No         Today's PHQ-2 Score:       1/5/2025    11:13 AM   PHQ-2 ( 1999 [employer])   Q1: Little interest or pleasure in doing things 0   Q2: Feeling down, depressed or hopeless 0   PHQ-2 Score 0    Q1: Little interest or pleasure in doing things Not at all   Q2: Feeling down, depressed or hopeless Not at all   PHQ-2 Score 0       Patient-reported           1/5/2025   Substance Use   Alcohol more than 3/day or more than 7/wk No   Do you use any other substances  "recreationally? No     Social History     Tobacco Use    Smoking status: Every Day     Current packs/day: 1.00     Average packs/day: 1 pack/day for 20.0 years (20.0 ttl pk-yrs)     Types: Cigarettes    Smokeless tobacco: Never    Tobacco comments:     1.00 ppd or less   Vaping Use    Vaping status: Never Used   Substance Use Topics    Alcohol use: Yes     Comment: Occasional - 6-7 drinks per week    Drug use: No     {Provider  If there are gaps in the social history shown above, please follow the link to update and then refresh the note Link to Social and Substance History :[id number]}      1/5/2025   STI Screening   New sexual partner(s) since last STI/HIV test? No   Last PSA:   PSA   Date Value Ref Range Status   06/19/2019 0.38 0 - 4 ug/L Final     Comment:     Assay Method:  Chemiluminescence using Siemens Vista analyzer     Prostate Specific Antigen Screen   Date Value Ref Range Status   10/28/2021 0.45 0.00 - 4.00 ug/L Final     ASCVD Risk   The 10-year ASCVD risk score (Carissa CAGE, et al., 2019) is: 20%    Values used to calculate the score:      Age: 59 years      Sex: Male      Is Non- : No      Diabetic: No      Tobacco smoker: Yes      Systolic Blood Pressure: 128 mmHg      Is BP treated: Yes      HDL Cholesterol: 44 mg/dL      Total Cholesterol: 226 mg/dL      {Provider  REQUIRED FOR AWV Use the storyboard to review patient history, after sections have been marked as reviewed, refresh note to capture documentation:[id number]}   Reviewed and updated as needed this visit by Provider                    Review of Systems  Constitutional, HEENT, cardiovascular, pulmonary, gi and gu systems are negative, except as otherwise noted.     Objective    Exam  /78 (BP Location: Right arm, Patient Position: Sitting, Cuff Size: Adult Regular)   Pulse 90   Temp 97.6  F (36.4  C) (Tympanic)   Resp 16   Ht 1.709 m (5' 7.3\")   Wt 79.5 kg (175 lb 3.2 oz)   SpO2 96%   BMI 27.20 kg/m   " "  Estimated body mass index is 27.2 kg/m  as calculated from the following:    Height as of this encounter: 1.709 m (5' 7.3\").    Weight as of this encounter: 79.5 kg (175 lb 3.2 oz).    Physical Exam  GENERAL: alert and no distress  NECK: no adenopathy, no asymmetry, masses, or scars  RESP: lungs clear to auscultation - no rales, rhonchi or wheezes  CV: regular rate and rhythm, normal S1 S2, no S3 or S4, no murmur, click or rub, no peripheral edema  ABDOMEN: soft, nontender, no hepatosplenomegaly, no masses and bowel sounds normal  MS: no gross musculoskeletal defects noted, no edema        Signed Electronically by: Stanley Leroy MD  {Email feedback regarding this note to [email or web address]   :[id number]}  "

## 2025-01-08 ENCOUNTER — THERAPY VISIT (OUTPATIENT)
Dept: OCCUPATIONAL THERAPY | Facility: CLINIC | Age: 60
End: 2025-01-08
Attending: FAMILY MEDICINE
Payer: OTHER MISCELLANEOUS

## 2025-01-08 DIAGNOSIS — M79.644 THUMB PAIN, RIGHT: ICD-10-CM

## 2025-01-08 DIAGNOSIS — M25.531 ARTHRALGIA OF RIGHT WRIST: ICD-10-CM

## 2025-01-08 PROCEDURE — 97165 OT EVAL LOW COMPLEX 30 MIN: CPT | Mod: GO | Performed by: OCCUPATIONAL THERAPIST

## 2025-01-08 PROCEDURE — 97535 SELF CARE MNGMENT TRAINING: CPT | Mod: GO | Performed by: OCCUPATIONAL THERAPIST

## 2025-01-08 PROCEDURE — 97110 THERAPEUTIC EXERCISES: CPT | Mod: GO | Performed by: OCCUPATIONAL THERAPIST

## 2025-01-08 NOTE — PROGRESS NOTES
OCCUPATIONAL THERAPY EVALUATION  Type of Visit: Evaluation       Fall Risk Screen:  Fall screen completed by: OT  Have you fallen 2 or more times in the past year?: No  Have you fallen and had an injury in the past year?: No  Is patient a fall risk?: No    Subjective        Presenting condition or subjective complaint: (Patient-Rptd) Flexibility of wristStarted having pain 4-5 years ago in wrist and thumb.   Patient relates he just had a physical and MD thought it may be helpful to get some ex. Reports hand locked last week  Date of onset:     4-5 years  Relevant medical history:   no  Dates & types of surgery:  no    Prior diagnostic imaging/testing results:       Prior therapy history for the same diagnosis, illness or injury: (Patient-Rptd) Yes (Patient-Rptd) On site therapists rubbing tendons and taping hand He had this in 2022    Prior Level of Function  Transfers: Independent  Ambulation: Independent  ADL: Independent  IADL:  independent    Living Environment  Social support: (Patient-Rptd) With a significant other or spouse   Type of home: (Patient-Rptd) House; Other   Stairs to enter the home: (Patient-Rptd) Yes (Patient-Rptd) 8 Is there a railing: (Patient-Rptd) Yes     Ramp: (Patient-Rptd) No   Stairs inside the home: (Patient-Rptd) Yes (Patient-Rptd) 8 Is there a railing: (Patient-Rptd) Yes     Help at home: (Patient-Rptd) None  Equipment owned:       Employment: (Patient-Rptd) No    Hobbies/Interests: (Patient-Rptd) Woodworking and refinishing antique furniture, gardening, and fishing    Patient goals for therapy: (Patient-Rptd) Maintain flexibility, have exercises to keep from flare ups    Pain assessment: See objective evaluation for additional pain details     Objective   ADDITIONAL HISTORY:  Right hand dominant  Patient reports symptoms of pain, stiffness/loss of motion, and edema  Transportation: drives  Currently retired    Functional Outcome Measure:       1/8/2025     9:39 AM   Upper Extremity  Functional Index (  1996 [facility] [place])   a.  Any of your usual work, housework or school activities 3-A Little bit of Difficulty   b.  Your usual hobbies, recreational or sporting activities 3-A Little bit of Difficulty   c.  Lifting a bag of groceries to waist level 4-No Difficulty   d.  Placing an object onto, or removing it from an overhead shelf 4-No Difficulty   e.  Washing your hair or scalp 4-No Difficulty   f.   Pushing up on your hands (e.g., from bathtub or chair) 4-No Difficulty   g.  Preparing food (e.g., peeling, cutting) 4-No Difficulty   h.  Driving 4-No Difficulty   I.   Vacuuming, sweeping, or raking 4-No Difficulty   j.   Dressing 4-No Difficulty   k.  Doing up buttons 4-No Difficulty   l.   Using tools or appliances 3-A Little bit of Difficulty   m. Opening doors 4-No Difficulty   n.  Cleaning 3-A Little bit of Difficulty   o.  Tying or lacing shoes 4-No Difficulty   p.  Sleeping 4-No Difficulty   q.  Laundering clothes. (e.g., washing, ironing, folding) 4-No Difficulty   r.   Opening a jar 3-A Little bit of Difficulty   s.  Throwing a ball 4-No Difficulty   t.   Carrying a small suitcase with your affected limb  3-A Little bit of Difficulty   Column Totals: /80 74        Patient-reported         PAIN:  Pain Level at Rest: 2/10  Pain Level with Use: 7/10  Pain Location: points to FCR region  Pain Quality: Aching  Pain Frequency: intermittent  Pain is Worst: daytime or nighttime  Pain is Exacerbated By: using  Pain is Relieved By: rest and Biofreeze  Pain Progression: Improved        EDEMA:  Right 17, left 16.5 DWC          SENSATION: WNL throughout all nerve distributions; per patient report         ROM:   Wrist ROM  Left AROM Right AROM    Extension 65 50   Flexion 60 40 pain end range       Thumb ROM  Left AROM Right AROM    Kapandji Opposition Scale (0-10/10) 10 10   Can make full fist             STRENGTH:     Measured in pounds 1/8/2025 1/8/2025    Left Right   Average 58 35     Lateral  Pinch  Measured in pounds 1/8/2025 1/8/2025    Left Right   Average 18 15 pain CMC region     3 Point Pinch  Measured in pounds 1/8/2025 1/8/2025    Left Right   Average 15 11 pain back of hand       PALPATION/ strength:  increased pain with resistance to APL and wrist flexion  pain with palpation to radial wrist, FCR region           Assessment & Plan   CLINICAL IMPRESSIONS  Medical Diagnosis: Right wrist arthralgia, thumb pain    Treatment Diagnosis: thumb and wrist pain affecting ADL's IADL's    Impression/Assessment: Pt is a 59 year old male presenting to Occupational Therapy due to above diagnoses.  The following significant findings have been identified: Impaired ROM, Impaired strength, Pain, and edema .  These identified deficits interfere with their ability to perform household chores and meal planning and preparation as compared to previous level of function.     Clinical Decision Making (Complexity):  Assessment of Occupational Performance: 1-3 Performance Deficits  Occupational Performance Limitations: meal preparation and cleanup, leisure activities, and lifting,   Clinical Decision Making (Complexity): Low complexity    PLAN OF CARE  Treatment Interventions:  Modalities:  US and Fluidotherapy  Therapeutic Exercise:  AROM, PROM, and Isometrics  Neuromuscular re-education:  Isometrics and Stabilization  Manual Techniques:  Friction massage and Myofascial release  Orthotic Fabrication:  Static, Hand based, and Forearm based  Self Care:  Self Care Tasks and Ergonomic Considerations    Long Term Goals   OT Goal 1  Goal Identifier: Home program  Goal Description: Patient to be independent with home program,not needing more than 15% cuing  Rationale: In order to maximize safety and independence with ADL/IADLs  Target Date: 03/05/25      Frequency of Treatment:    Duration of Treatment:       Recommended Referrals to Other Professionals:   Education Assessment: Learner/Method:  Patient;Listening;Reading;Demonstration;Pictures/Video;No Barriers to Learning  Education Comments: PTRX printed     Risks and benefits of evaluation/treatment have been explained.   Patient/Family/caregiver agrees with Plan of Care.     Evaluation Time:    OT Dani, Low Complexity Minutes (66658): 25       Signing Clinician: JOSE RAUL Ibarra/L CHT  Occupational Therapist, Certified Hand Therapist

## 2025-01-10 NOTE — PROGRESS NOTES
ASSESSMENT & PLAN    Robert was seen today for follow up and pain.    Diagnoses and all orders for this visit:    Chronic pain of left knee  -     XR Knee Standing AP [place] Bilat Lat Left; Future  -     Large Joint Injection/Arthocentesis: L knee joint    Arthritis of right foot  -     Small Joint Injection/Arthrocentesis: R intertarsal      # Acute on Chronic Left Knee and Right Foot Pain: Anthony De La Torre  was seen today for left knee and right foot pain. Symptoms had been going on for years, worsening. On examination there are positive findings of tenderness to palpation over the dorsal foot, medial joint line. Imaging findings showed moderate left knee arthritis. Likely cause of patient's condition due to left knee and right foot arthritis flare.   Counseled patient on nature of condition and treatment options.  Given this plan as below, follow-up 1 mon as needed.     Image Findings: left knee arthritis, some cystic changes  Treatment: Activities as tolerated, home exercises given today  Medications/Injections: Limited tylenol/ibuprofen for pain for 1-2 weeks, Topical Voltaren gel, right foot (talonavicular) and left knee joint steroid injection, left knee aspiration  Follow-up: In one month if symptoms do not improve, sooner if worsening  Can consider repeat evaluation, PT     -----    SUBJECTIVE:  Anthony De La Torre is a 59 year old male who is seen in f/u up for right foot arthritis. Since last visit on 2/26/2024 patient has noticed a return in right medial dorsal foot pain. Right intertarsal joint injection performed on 2/26/2024 allowed for 7+ months of relief in pain. The patient is seen by themselves    He is hopeful for surgical referral since he has to take ibuprofen regularly just to take a walk. Currently not doing any pain relieving measures.       Patient's past medical, surgical, social, and family histories were reviewed today and no changes are noted.    REVIEW OF SYSTEMS:  Constitutional: NEGATIVE for  fever, chills, change in weight  Skin: NEGATIVE for worrisome rashes, moles or lesions  GI/: NEGATIVE for bowel or bladder changes  Neuro: NEGATIVE for weakness, dizziness or paresthesias    OBJECTIVE:  There were no vitals taken for this visit.   General: healthy, alert and in no distress  HEENT: no scleral icterus or conjunctival erythema  Skin: no suspicious lesions or rash. No jaundice.  CV: regular rhythm by palpation, no pedal edema  Resp: normal respiratory effort without conversational dyspnea   Psych: normal mood and affect  Gait: normal steady gait with appropriate coordination and balance  Neuro: normal light touch sensory exam of the extremities.    MSK:    RIGHT FOOT  Inspection:    swelling over the dorsal midfoot  Palpation:    Tender about the talonavicular    No tenderness over the proximal 5th metatarsal or Lisfranc joint  Range of Motion:     Active toe flexion and extension  - intact    Active ankle range of motion - intact    Passive ankle range of motion - intact  Strength:    Intrinsic foot musculature strength - intact    Ankle strength - intact  Special Tests:    Positive: None    Negative: calcaneal squeeze and Lisfranc joint tenderness      LEFT KNEE  Inspection:    Normal alignment; no edema, erythema, or ecchymosis present  Palpation:    Tender about the medial joint line. Remainder of bony and ligamentous landmarks are nontender.    Moderate effusion is present    Patellofemoral crepitus is Present  Range of Motion:     00 extension to 1350 flexion  Strength:    Quadriceps 5/5, hamstrings 5/5, gastrocsoleus 5/5, and tibialis anterior 5/5    Extensor mechanism intact  Special Tests:    Positive: None    Negative: Patellar grind, MCL/valgus stress (0 & 30 deg), LCL/varus stress (0 & 30 deg), Lachman's, anterior drawer, posterior drawer    Independent visualization of the below image:     Mild left knee arthritis, knee effusion    Reviewed right foot x-rays    Armand Elizabeth MD,  [facility]    Disclaimer: This note consists of symbols derived from keyboarding, dictation and/or voice recognition software. As a result, there may be errors in the script that have gone undetected. Please consider this when interpreting information found in this chart.     Large Joint Injection/Arthocentesis: L knee joint    Date/Time: 1/13/2025 4:02 PM    Performed by: Armand Elizabeth MD  Authorized by: Armand Elizabeth MD    Indications:  Pain  Needle Size:  21 G  Guidance: ultrasound    Approach:  Superolateral  Location:  Knee      Medications:  6 mg betamethasone acet & sod phos 6 (3-3) MG/ML; 4 mL ROPivacaine 5 MG/ML  Aspirate amount (mL):  4  Aspirate:  Yellow and clear  Outcome:  Tolerated well, no immediate complications  Procedure discussed: discussed risks, benefits, and alternatives    Consent Given by:  Patient  Timeout: timeout called immediately prior to procedure    Prep: patient was prepped and draped in usual sterile fashion     Ultrasound images of procedure were permanently stored.      Patient reported some improvement of pain after the numbing portion right 2nd TMT joint and left knee joint aspiration/steroid injections.  Ultrasound guided images were permanently stored.   Aftercare instructions given to patient.  Plan to follow-up as discussed above.     Armand Elizabeth MD [facility]    Small Joint Injection/Arthrocentesis: R intertarsal    Date/Time: 1/13/2025 4:03 PM    Performed by: Armand Elizabeth MD  Authorized by: Armand Elizabeth MD          Location:  Foot   Location comment:  Talonavicular    Site:  R intertarsal                  Medications:  6 mg betamethasone acet & sod phos 6 (3-3) MG/ML; 1 mL ROPivacaine 5 MG/ML        Outcome:  Tolerated well, no immediate complications  Procedure discussed: discussed risks, benefits, and alternatives    Consent Given by:  Patient    Prep: patient was prepped and  draped in usual sterile fashion       Ultrasound images of procedure were permanently stored.

## 2025-01-13 ENCOUNTER — OFFICE VISIT (OUTPATIENT)
Dept: ORTHOPEDICS | Facility: CLINIC | Age: 60
End: 2025-01-13
Payer: COMMERCIAL

## 2025-01-13 ENCOUNTER — ANCILLARY PROCEDURE (OUTPATIENT)
Dept: GENERAL RADIOLOGY | Facility: CLINIC | Age: 60
End: 2025-01-13
Attending: FAMILY MEDICINE
Payer: COMMERCIAL

## 2025-01-13 DIAGNOSIS — M25.562 CHRONIC PAIN OF LEFT KNEE: ICD-10-CM

## 2025-01-13 DIAGNOSIS — M25.562 CHRONIC PAIN OF LEFT KNEE: Primary | ICD-10-CM

## 2025-01-13 DIAGNOSIS — M19.071 ARTHRITIS OF RIGHT FOOT: ICD-10-CM

## 2025-01-13 DIAGNOSIS — G89.29 CHRONIC PAIN OF LEFT KNEE: ICD-10-CM

## 2025-01-13 DIAGNOSIS — G89.29 CHRONIC PAIN OF LEFT KNEE: Primary | ICD-10-CM

## 2025-01-13 PROCEDURE — 73562 X-RAY EXAM OF KNEE 3: CPT | Mod: TC | Performed by: RADIOLOGY

## 2025-01-13 RX ADMIN — BETAMETHASONE SODIUM PHOSPHATE AND BETAMETHASONE ACETATE 6 MG: 3; 3 INJECTION, SUSPENSION INTRA-ARTICULAR; INTRALESIONAL; INTRAMUSCULAR; SOFT TISSUE at 16:03

## 2025-01-13 RX ADMIN — BETAMETHASONE SODIUM PHOSPHATE AND BETAMETHASONE ACETATE 6 MG: 3; 3 INJECTION, SUSPENSION INTRA-ARTICULAR; INTRALESIONAL; INTRAMUSCULAR; SOFT TISSUE at 16:02

## 2025-01-13 RX ADMIN — ROPIVACAINE HYDROCHLORIDE 4 ML: 5 INJECTION, SOLUTION EPIDURAL; INFILTRATION; PERINEURAL at 16:02

## 2025-01-13 RX ADMIN — ROPIVACAINE HYDROCHLORIDE 1 ML: 5 INJECTION, SOLUTION EPIDURAL; INFILTRATION; PERINEURAL at 16:03

## 2025-01-13 NOTE — LETTER
1/13/2025      Anthony De La Torre  [address] [address]      Dear Colleague,    Thank you for referring your patient, Anthony De La Torre, to the [facility] [place]. Please see a copy of my visit note below.    ASSESSMENT & PLAN    Robert was seen today for follow up and pain.    Diagnoses and all orders for this visit:    Chronic pain of left knee  -     XR Knee Standing AP [place] Bilat Lat Left; Future  -     Large Joint Injection/Arthocentesis: L knee joint    Arthritis of right foot  -     Small Joint Injection/Arthrocentesis: R intertarsal      # Acute on Chronic Left Knee and Right Foot Pain: Anthony De La Torre  was seen today for left knee and right foot pain. Symptoms had been going on for years, worsening. On examination there are positive findings of tenderness to palpation over the dorsal foot, medial joint line. Imaging findings showed moderate left knee arthritis. Likely cause of patient's condition due to left knee and right foot arthritis flare.   Counseled patient on nature of condition and treatment options.  Given this plan as below, follow-up 1 mon as needed.     Image Findings: left knee arthritis, some cystic changes  Treatment: Activities as tolerated, home exercises given today  Medications/Injections: Limited tylenol/ibuprofen for pain for 1-2 weeks, Topical Voltaren gel, right foot (talonavicular) and left knee joint steroid injection, left knee aspiration  Follow-up: In one month if symptoms do not improve, sooner if worsening  Can consider repeat evaluation, PT     -----    SUBJECTIVE:  Anthony De La Torre is a 59 year old male who is seen in f/u up for right foot arthritis. Since last visit on 2/26/2024 patient has noticed a return in right medial dorsal foot pain. Right intertarsal joint injection performed on 2/26/2024 allowed for 7+ months of relief in pain. The patient is seen by themselves    He is hopeful for surgical referral since he has to take  ibuprofen regularly just to take a walk. Currently not doing any pain relieving measures.       Patient's past medical, surgical, social, and family histories were reviewed today and no changes are noted.    REVIEW OF SYSTEMS:  Constitutional: NEGATIVE for fever, chills, change in weight  Skin: NEGATIVE for worrisome rashes, moles or lesions  GI/: NEGATIVE for bowel or bladder changes  Neuro: NEGATIVE for weakness, dizziness or paresthesias    OBJECTIVE:  There were no vitals taken for this visit.   General: healthy, alert and in no distress  HEENT: no scleral icterus or conjunctival erythema  Skin: no suspicious lesions or rash. No jaundice.  CV: regular rhythm by palpation, no pedal edema  Resp: normal respiratory effort without conversational dyspnea   Psych: normal mood and affect  Gait: normal steady gait with appropriate coordination and balance  Neuro: normal light touch sensory exam of the extremities.    MSK:    RIGHT FOOT  Inspection:    swelling over the dorsal midfoot  Palpation:    Tender about the talonavicular    No tenderness over the proximal 5th metatarsal or Lisfranc joint  Range of Motion:     Active toe flexion and extension  - intact    Active ankle range of motion - intact    Passive ankle range of motion - intact  Strength:    Intrinsic foot musculature strength - intact    Ankle strength - intact  Special Tests:    Positive: None    Negative: calcaneal squeeze and Lisfranc joint tenderness      LEFT KNEE  Inspection:    Normal alignment; no edema, erythema, or ecchymosis present  Palpation:    Tender about the medial joint line. Remainder of bony and ligamentous landmarks are nontender.    Moderate effusion is present    Patellofemoral crepitus is Present  Range of Motion:     00 extension to 1350 flexion  Strength:    Quadriceps 5/5, hamstrings 5/5, gastrocsoleus 5/5, and tibialis anterior 5/5    Extensor mechanism intact  Special Tests:    Positive: None    Negative: Patellar grind,  MCL/valgus stress (0 & 30 deg), LCL/varus stress (0 & 30 deg), Lachman's, anterior drawer, posterior drawer    Independent visualization of the below image:     Mild left knee arthritis, knee effusion    Reviewed right foot x-rays    Armand Elizabeth MD, [facility] [place]    Disclaimer: This note consists of symbols derived from keyboarding, dictation and/or voice recognition software. As a result, there may be errors in the script that have gone undetected. Please consider this when interpreting information found in this chart.     Large Joint Injection/Arthocentesis: L knee joint    Date/Time: 1/13/2025 4:02 PM    Performed by: Armand Elizabeth MD  Authorized by: Armand Elizabeth MD    Indications:  Pain  Needle Size:  21 G  Guidance: ultrasound    Approach:  Superolateral  Location:  Knee      Medications:  6 mg betamethasone acet & sod phos 6 (3-3) MG/ML; 4 mL ROPivacaine 5 MG/ML  Aspirate amount (mL):  4  Aspirate:  Yellow and clear  Outcome:  Tolerated well, no immediate complications  Procedure discussed: discussed risks, benefits, and alternatives    Consent Given by:  Patient  Timeout: timeout called immediately prior to procedure    Prep: patient was prepped and draped in usual sterile fashion     Ultrasound images of procedure were permanently stored.      Patient reported some improvement of pain after the numbing portion right 2nd TMT joint and left knee joint aspiration/steroid injections.  Ultrasound guided images were permanently stored.   Aftercare instructions given to patient.  Plan to follow-up as discussed above.     Armand Elizabeth MD [facility]    Small Joint Injection/Arthrocentesis: R intertarsal    Date/Time: 1/13/2025 4:03 PM    Performed by: Armand Elizabeth MD  Authorized by: Armand Elizabeth MD          Location:  Foot   Location comment:  Talonavicular    Site:  R intertarsal                  Medications:  6 mg  betamethasone acet & sod phos 6 (3-3) MG/ML; 1 mL ROPivacaine 5 MG/ML        Outcome:  Tolerated well, no immediate complications  Procedure discussed: discussed risks, benefits, and alternatives    Consent Given by:  Patient    Prep: patient was prepped and draped in usual sterile fashion       Ultrasound images of procedure were permanently stored.             Again, thank you for allowing me to participate in the care of your patient.        Sincerely,        Armand Elizabeth MD    Electronically signed

## 2025-01-13 NOTE — PATIENT INSTRUCTIONS
# Acute on Chronic Left Knee and Right Foot Pain: Anthony De La Torre  was seen today for left knee and right foot pain. Symptoms had been going on for years, worsening. On examination there are positive findings of tenderness to palpation over the dorsal foot, medial joint line. Imaging findings showed moderate left knee arthritis. Likely cause of patient's condition due to left knee and right foot arthritis flare.   Counseled patient on nature of condition and treatment options.  Given this plan as below, follow-up 1 mon as needed.     Image Findings: left knee arthritis, some cystic changes  Treatment: Activities as tolerated, home exercises given today  Medications/Injections: Limited tylenol/ibuprofen for pain for 1-2 weeks, Topical Voltaren gel, right foot (talonavicular) and left knee joint steroid injection, left knee aspiration  Follow-up: In one month if symptoms do not improve, sooner if worsening  Can consider repeat evaluation, PT     Please call [phone]   Ask for my team if you have any questions or concerns    If you have not yet received the influenza vaccine but would like to get one, please call  [phone] or you can schedule via [employer]    It was great seeing you again today!    Armand Elizabeth MD, CAQSM     FS Injection Discharge Instructions    Procedure: left knee aspiration, left knee and right foot steroid injections    You may shower, however avoid swimming, tub baths or hot tubs for 24 hours following your procedure  You may have a mild to moderate increase in pain for several days following the injection.  It may take up to 14 days for the steroid medication to start working although you may feel the effect as early as a few days after the procedure.  You may use ice packs for 10-15 minutes, 3 to 4 times a day at the injection site for comfort  You may use anti-inflammatory medications (such as Ibuprofen or Aleve or Advil) or Tylenol for pain control if necessary  If you were  fasting, you may resume your normal diet and medications after the procedure  If you have diabetes, check your blood sugar more frequently than usual as your blood sugar may be higher than normal for 10-14 days following a steroid injection. Contact your doctor who manages your diabetes if your blood sugar is higher than usual    If you experience any of the following, call [facility] @ [phone] or [phone]  -Fever over 100 degree F  -Swelling, bleeding, redness, drainage, warmth at the injection site  - New or worsening pain

## 2025-01-16 RX ORDER — ROPIVACAINE HYDROCHLORIDE 5 MG/ML
1 INJECTION, SOLUTION EPIDURAL; INFILTRATION; PERINEURAL
Status: COMPLETED | OUTPATIENT
Start: 2025-01-13 | End: 2025-01-13

## 2025-01-16 RX ORDER — ROPIVACAINE HYDROCHLORIDE 5 MG/ML
4 INJECTION, SOLUTION EPIDURAL; INFILTRATION; PERINEURAL
Status: COMPLETED | OUTPATIENT
Start: 2025-01-13 | End: 2025-01-13

## 2025-01-16 RX ORDER — BETAMETHASONE SODIUM PHOSPHATE AND BETAMETHASONE ACETATE 3; 3 MG/ML; MG/ML
6 INJECTION, SUSPENSION INTRA-ARTICULAR; INTRALESIONAL; INTRAMUSCULAR; SOFT TISSUE
Status: COMPLETED | OUTPATIENT
Start: 2025-01-13 | End: 2025-01-13

## 2025-04-13 ENCOUNTER — OFFICE VISIT (OUTPATIENT)
Dept: URGENT CARE | Facility: URGENT CARE | Age: 60
End: 2025-04-13
Payer: COMMERCIAL

## 2025-04-13 VITALS
BODY MASS INDEX: 27.44 KG/M2 | SYSTOLIC BLOOD PRESSURE: 129 MMHG | DIASTOLIC BLOOD PRESSURE: 82 MMHG | RESPIRATION RATE: 16 BRPM | OXYGEN SATURATION: 98 % | HEIGHT: 67 IN | HEART RATE: 75 BPM

## 2025-04-13 DIAGNOSIS — S61.211A LACERATION OF LEFT INDEX FINGER WITHOUT FOREIGN BODY WITHOUT DAMAGE TO NAIL, INITIAL ENCOUNTER: Primary | ICD-10-CM

## 2025-04-13 PROCEDURE — 3074F SYST BP LT 130 MM HG: CPT | Performed by: FAMILY MEDICINE

## 2025-04-13 PROCEDURE — 12001 RPR S/N/AX/GEN/TRNK 2.5CM/<: CPT | Performed by: FAMILY MEDICINE

## 2025-04-13 PROCEDURE — 3079F DIAST BP 80-89 MM HG: CPT | Performed by: FAMILY MEDICINE

## 2025-04-13 NOTE — PROGRESS NOTES
Urgent Care Clinic Visit    Chief Complaint   Patient presents with    Finger     Cut on left index finger

## 2025-04-14 NOTE — PROGRESS NOTES
Anthony De La Torre is a 59 year old male who presents to the clinic with a laceration on the finger sustained 1 hours ago.  This is a non-work related injury.  Mechanism of injury: knife.    Associated symptoms: Denies numbness, weakness, or loss of function  Last tetanus booster within 10 years: yes 1/2023    Patient Active Problem List   Diagnosis    Plantar fascial fibromatosis    Achilles tendonitis    Fatigue    HTN (hypertension)    Intermittent asthma    Sensorineural hearing loss, asymmetrical    Hyperlipidemia with target LDL less than 130    Tinnitus of both ears    Cervicalgia    Mild intermittent asthma without complication    Essential hypertension with goal blood pressure less than 140/90    Colon polyps-tubular adenoma    Sacroiliitis    Lumbar strain, initial encounter    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)    Tendonitis of wrist, right    Arthralgia of right wrist       Social History     Socioeconomic History    Marital status:      Spouse name: Albertina De La Torre    Number of children: 2    Years of education: 12+    Highest education level: Not on file   Occupational History    Occupation: [employer]     Employer: [employer]   Tobacco Use    Smoking status: Every Day     Current packs/day: 1.00     Average packs/day: 1 pack/day for 20.0 years (20.0 ttl pk-yrs)     Types: Cigarettes    Smokeless tobacco: Never    Tobacco comments:     1.00 ppd or less   Vaping Use    Vaping status: Never Used   Substance and Sexual Activity    Alcohol use: Yes     Comment: Occasional - 6-7 drinks per week    Drug use: No    Sexual activity: Yes     Partners: Female     Birth control/protection: Surgical     Comment: Patient has had a vasectomy   Other Topics Concern    Parent/sibling w/ CABG, MI or angioplasty before 65F 55M? No   Social History Narrative    Not on file     Social Drivers of Health     Financial Resource Strain: Low Risk  (1/5/2025)    Financial Resource Strain     Within the past 12  months, have you or your family members you live with been unable to get utilities (heat, electricity) when it was really needed?: No   Food Insecurity: Low Risk  (1/5/2025)    Food Insecurity     Within the past 12 months, did you worry that your food would run out before you got money to buy more?: No     Within the past 12 months, did the food you bought just not last and you didn t have money to get more?: No   Transportation Needs: Low Risk  (1/5/2025)    Transportation Needs     Within the past 12 months, has lack of transportation kept you from medical appointments, getting your medicines, non-medical meetings or appointments, work, or from getting things that you need?: No   Physical Activity: Insufficiently Active (1/5/2025)    Exercise Vital Sign     Days of Exercise per Week: 3 days     Minutes of Exercise per Session: 30 min   Stress: No Stress Concern Present (1/5/2025)    [employer] [place] [employer] - Occupational Stress Questionnaire     Feeling of Stress : Only a little   Social Connections: Unknown (1/5/2025)    Social Connection and Isolation Panel [NHANES]     Frequency of Communication with Friends and Family: Not on file     Frequency of Social Gatherings with Friends and Family: Three times a week     Attends Jehovah's witness Services: Not on file     Active Member of Clubs or Organizations: Not on file     Attends Club or Organization Meetings: Not on file     Marital Status: Not on file   Interpersonal Safety: Low Risk  (1/6/2025)    Interpersonal Safety     Do you feel physically and emotionally safe where you currently live?: Yes     Within the past 12 months, have you been hit, slapped, kicked or otherwise physically hurt by someone?: No     Within the past 12 months, have you been humiliated or emotionally abused in other ways by your partner or ex-partner?: No   Housing Stability: Low Risk  (1/5/2025)    Housing Stability     Do you have housing? : Yes     Are you worried about  "losing your housing?: No       Current Outpatient Medications   Medication Sig Dispense Refill    albuterol (PROAIR HFA/PROVENTIL HFA/VENTOLIN HFA) 108 (90 Base) MCG/ACT inhaler Inhale 2 puffs into the lungs every 6 hours as needed for shortness of breath. 18 g 1    amLODIPine (NORVASC) 2.5 MG tablet TAKE 1 TABLET DAILY 90 tablet 3    ascorbic acid (VITAMIN C) 1000 MG TABS Take 1,000 mg by mouth daily.      aspirin 81 MG chewable tablet Take 81 mg by mouth daily.      Bioflavonoid Products (BIOFLEX PO) Take 1-2 tablets by mouth daily.      buPROPion (WELLBUTRIN SR) 150 MG 12 hr tablet Take 1 tablet (150 mg) by mouth 2 times daily 60 tablet 1    cetirizine HCl 10 MG CAPS Take 25 mg by mouth daily as needed      fish oil-omega-3 fatty acids 1000 MG capsule Take 2 g by mouth daily.      fluticasone (FLONASE) 50 MCG/ACT nasal spray       hydrochlorothiazide (HYDRODIURIL) 25 MG tablet TAKE 1 TABLET DAILY 90 tablet 3    losartan (COZAAR) 100 MG tablet TAKE 1 TABLET DAILY 90 tablet 3    rosuvastatin (CRESTOR) 20 MG tablet Take 1 tablet (20 mg) by mouth daily. 90 tablet 1    sildenafil (REVATIO) 20 MG tablet 2-5 tabs prior to sex 90 tablet 3       EXAM: The patient appears today in alert distress  VITALS: Blood pressure 129/82, pulse 75, resp. rate 16, height 1.702 m (5' 7\"), SpO2 98%.    Size of laceration: 2 centimeters  Characteristics of the laceration: bleeding- moderate and clean  Tendon function intact: yes  Sensation to light touch intact: yes  Pulses intact: yes    Picture included in patient's chart: no    Assessment:  2 centimeter laceration EFT index finger between DIP and PIP joints sustained while catching a knife reflexively as it was falling.  DERMABOND for closure.    PLAN:  Wound cleaned with betadine/saline solution  Dermabond was applied  After care instructions:  Keep wound clean and dry for the next 24-48 hours  Signs of infection discussed today.  Placed in splint with wooden tongue depressor to " restrict bending of joint while heals    Megan Wheatley MD  [facility]

## 2025-04-18 NOTE — PROGRESS NOTES
ASSESSMENT & PLAN    Robert was seen today for follow up and pain.    Diagnoses and all orders for this visit:    Arthritis of right foot  -     Orthotics, Mastectomy and Custom Compression Orders Type: Orthotic; Orthotic Type Requested: Foot Orthotics; Foot Orthotics Laterality: Right  -     Orthopedic  Referral; Future        # Acute on Chronic Right Foot Pain: Anthony De La Torre  was seen today for right foot pain. Symptoms had been going on for 3+ mon, last steroid injection on 1/13/25 helped for about one month. On examination there are positive findings of tenderness to palpation over the talonavicular joint. Imaging findings showed severe foot arthritis. Likely cause of patient's condition due to flare of talnonavicular joint arthritis. Given last injection didn't help for more than one mon, plan to repeat once and refer to podiatry.  Plan otherwise as below.    Image Findings: severe talonavicular arthritis  Treatment: Activities as tolerated, podiatry and orthotics referral.  Medications/Injections: Limited tylenol/ibuprofen for pain for 1-2 weeks, Topical Voltaren gel, right talonavicular joint  Follow-up: Podiatry for right foot, follow-up with me as needed for left knee    -----    SUBJECTIVE:  Anthony De La Torre is a 59 year old male who is seen in follow-up for right foot pain. They were last seen 1/13/2025 and left knee joint and right foot injection were performed.  The patient is seen with their wife.    Since their last visit reports that he had relief for about 1-2 weeks. Pain is located lateral talar dome as well as medial talus into medial metatarsals.  They indicate that their current pain level is 5/10. They have tried right foot intertarsal steroid injection 1/13/25, 2/26/24, right foot cyst aspiration and injection 7/14/2020.        Patient's past medical, surgical, social, and family histories were reviewed today and no changes are noted.    REVIEW OF SYSTEMS:  Constitutional: NEGATIVE for  fever, chills, change in weight  Skin: NEGATIVE for worrisome rashes, moles or lesions  GI/: NEGATIVE for bowel or bladder changes  Neuro: NEGATIVE for weakness, dizziness or paresthesias    OBJECTIVE:  There were no vitals taken for this visit.   General: healthy, alert and in no distress  HEENT: no scleral icterus or conjunctival erythema  Skin: no suspicious lesions or rash. No jaundice.  CV: regular rhythm by palpation, no pedal edema  Resp: normal respiratory effort without conversational dyspnea   Psych: normal mood and affect  Gait: normal steady gait with appropriate coordination and balance  Neuro: normal light touch sensory exam of the extremities.    MSK:    RIGHT FOOT  Inspection:    no swelling    Palpation:    Tender about the talonavicular joint    No tenderness over the 1st MTP joints, Lisfranc joint, or plantar fascia origin  Range of Motion:     Active toe flexion and extension  - intact  Tightness with ankle inversion/eversion  Strength:    Intrinsic foot musculature strength - intact    Ankle strength - intact  Special Tests:    Positive: None    Negative: calcaneal squeeze and Lisfranc joint tenderness    Independent visualization of the below image:  FOOT RIGHT THREE OR MORE VIEWS February 26, 2024 7:43 AM      HISTORY: Right foot pain.     COMPARISON: None.                                                                      IMPRESSION: Small calcaneal enthesophytes. Severe osteoarthrosis of  the talonavicular joint. Mild osteoarthrosis of the first MTP joint.  Hardware in the medial malleolus. No evidence of fracture.     MD Armand PENA MD, [facility]    Disclaimer: This note consists of symbols derived from keyboarding, dictation and/or voice recognition software. As a result, there may be errors in the script that have gone undetected. Please consider this when interpreting information found in this chart.    Small Joint  Injection/Arthrocentesis    Date/Time: 4/21/2025 7:51 AM    Performed by: Armand Elizabeth MD  Authorized by: Armand Elizabeth MD    Needle Size:  25 G  Guidance: ultrasound     Approach:  Dorsal  Location:  Foot   Location comment:  Talonavicular joint                      Medications:  6 mg betamethasone acet & sod phos 6 (3-3) MG/ML; 1 mL ROPivacaine 5 MG/ML        Outcome:  Tolerated well, no immediate complications  Procedure discussed: discussed risks, benefits, and alternatives    Consent Given by:  Patient  Timeout: timeout called immediately prior to procedure    Prep: patient was prepped and draped in usual sterile fashion       US images of the procedure were permanently stored.    Patient reported significant improvement of pain after the numbing portion right talonavicular joint steroid injection.  Ultrasound guided images were permanently stored.   Aftercare instructions given to patient.  Plan to follow-up as discussed above.     Armand Elizabeth MD [facility]

## 2025-04-21 ENCOUNTER — OFFICE VISIT (OUTPATIENT)
Dept: ORTHOPEDICS | Facility: CLINIC | Age: 60
End: 2025-04-21
Payer: COMMERCIAL

## 2025-04-21 DIAGNOSIS — M19.071 ARTHRITIS OF RIGHT FOOT: Primary | ICD-10-CM

## 2025-04-21 PROCEDURE — 20604 DRAIN/INJ JOINT/BURSA W/US: CPT | Mod: RT | Performed by: FAMILY MEDICINE

## 2025-04-21 RX ORDER — BETAMETHASONE SODIUM PHOSPHATE AND BETAMETHASONE ACETATE 3; 3 MG/ML; MG/ML
6 INJECTION, SUSPENSION INTRA-ARTICULAR; INTRALESIONAL; INTRAMUSCULAR; SOFT TISSUE
Status: COMPLETED | OUTPATIENT
Start: 2025-04-21 | End: 2025-04-21

## 2025-04-21 RX ORDER — ROPIVACAINE HYDROCHLORIDE 5 MG/ML
1 INJECTION, SOLUTION EPIDURAL; INFILTRATION; PERINEURAL
Status: COMPLETED | OUTPATIENT
Start: 2025-04-21 | End: 2025-04-21

## 2025-04-21 RX ADMIN — BETAMETHASONE SODIUM PHOSPHATE AND BETAMETHASONE ACETATE 6 MG: 3; 3 INJECTION, SUSPENSION INTRA-ARTICULAR; INTRALESIONAL; INTRAMUSCULAR; SOFT TISSUE at 07:51

## 2025-04-21 RX ADMIN — ROPIVACAINE HYDROCHLORIDE 1 ML: 5 INJECTION, SOLUTION EPIDURAL; INFILTRATION; PERINEURAL at 07:51

## 2025-04-21 NOTE — LETTER
4/21/2025      Anthony De La Torre  [address] [id number]      Dear Colleague,    Thank you for referring your patient, Anthony De La Torre, to the [facility]. Please see a copy of my visit note below.    ASSESSMENT & PLAN    Robert was seen today for follow up and pain.    Diagnoses and all orders for this visit:    Arthritis of right foot  -     Orthotics, Mastectomy and Custom Compression Orders Type: Orthotic; Orthotic Type Requested: Foot Orthotics; Foot Orthotics Laterality: Right  -     Orthopedic  Referral; Future        # Acute on Chronic Right Foot Pain: Anthony De La Torre  was seen today for right foot pain. Symptoms had been going on for 3+ mon, last steroid injection on 1/13/25 helped for about one month. On examination there are positive findings of tenderness to palpation over the talonavicular joint. Imaging findings showed severe foot arthritis. Likely cause of patient's condition due to flare of talnonavicular joint arthritis. Given last injection didn't help for more than one mon, plan to repeat once and refer to podiatry.  Plan otherwise as below.    Image Findings: severe talonavicular arthritis  Treatment: Activities as tolerated, podiatry and orthotics referral.  Medications/Injections: Limited tylenol/ibuprofen for pain for 1-2 weeks, Topical Voltaren gel, right talonavicular joint  Follow-up: Podiatry for right foot, follow-up with me as needed for left knee    -----    SUBJECTIVE:  Anthony De La Torre is a 59 year old male who is seen in follow-up for right foot pain. They were last seen 1/13/2025 and left knee joint and right foot injection were performed.  The patient is seen with their wife.    Since their last visit reports that he had relief for about 1-2 weeks. Pain is located lateral talar dome as well as medial talus into medial metatarsals.  They indicate that their current pain level is 5/10. They have tried right foot intertarsal  steroid injection 1/13/25, 2/26/24, right foot cyst aspiration and injection 7/14/2020.        Patient's past medical, surgical, social, and family histories were reviewed today and no changes are noted.    REVIEW OF SYSTEMS:  Constitutional: NEGATIVE for fever, chills, change in weight  Skin: NEGATIVE for worrisome rashes, moles or lesions  GI/: NEGATIVE for bowel or bladder changes  Neuro: NEGATIVE for weakness, dizziness or paresthesias    OBJECTIVE:  There were no vitals taken for this visit.   General: healthy, alert and in no distress  HEENT: no scleral icterus or conjunctival erythema  Skin: no suspicious lesions or rash. No jaundice.  CV: regular rhythm by palpation, no pedal edema  Resp: normal respiratory effort without conversational dyspnea   Psych: normal mood and affect  Gait: normal steady gait with appropriate coordination and balance  Neuro: normal light touch sensory exam of the extremities.    MSK:    RIGHT FOOT  Inspection:    no swelling    Palpation:    Tender about the talonavicular joint    No tenderness over the 1st MTP joints, Lisfranc joint, or plantar fascia origin  Range of Motion:     Active toe flexion and extension  - intact  Tightness with ankle inversion/eversion  Strength:    Intrinsic foot musculature strength - intact    Ankle strength - intact  Special Tests:    Positive: None    Negative: calcaneal squeeze and Lisfranc joint tenderness    Independent visualization of the below image:  FOOT RIGHT THREE OR MORE VIEWS February 26, 2024 7:43 AM      HISTORY: Right foot pain.     COMPARISON: None.                                                                      IMPRESSION: Small calcaneal enthesophytes. Severe osteoarthrosis of  the talonavicular joint. Mild osteoarthrosis of the first MTP joint.  Hardware in the medial malleolus. No evidence of fracture.     MD Armand PENA MD, [facility]    Disclaimer: This note  consists of symbols derived from keyboarding, dictation and/or voice recognition software. As a result, there may be errors in the script that have gone undetected. Please consider this when interpreting information found in this chart.    Small Joint Injection/Arthrocentesis    Date/Time: 4/21/2025 7:51 AM    Performed by: Armand Elizabeth MD  Authorized by: Armand Elizabeth MD    Needle Size:  25 G  Guidance: ultrasound     Approach:  Dorsal  Location:  Foot   Location comment:  Talonavicular joint                      Medications:  6 mg betamethasone acet & sod phos 6 (3-3) MG/ML; 1 mL ROPivacaine 5 MG/ML        Outcome:  Tolerated well, no immediate complications  Procedure discussed: discussed risks, benefits, and alternatives    Consent Given by:  Patient  Timeout: timeout called immediately prior to procedure    Prep: patient was prepped and draped in usual sterile fashion       US images of the procedure were permanently stored.    Patient reported significant improvement of pain after the numbing portion right talonavicular joint steroid injection.  Ultrasound guided images were permanently stored.   Aftercare instructions given to patient.  Plan to follow-up as discussed above.     Armand Elizabeth MD [facility]                Again, thank you for allowing me to participate in the care of your patient.        Sincerely,        Armand Elizabeth MD    Electronically signed

## 2025-04-21 NOTE — PATIENT INSTRUCTIONS
# Acute on Chronic Right Foot Pain: Anthony De La Torre  was seen today for right foot pain. Symptoms had been going on for 3+ mon, last steroid injection on 1/13/25 helped for about one month. On examination there are positive findings of tenderness to palpation over the talonavicular joint. Imaging findings showed severe foot arthritis. Likely cause of patient's condition due to flare of talnonavicular joint arthritis. Given last injection didn't help for more than one mon, plan to repeat once and refer to podiatry.  Plan otherwise as below.    Image Findings: severe talonavicular arthritis  Treatment: Activities as tolerated, podiatry and orthotics referral.  Medications/Injections: Limited tylenol/ibuprofen for pain for 1-2 weeks, Topical Voltaren gel, right talonavicular joint  Follow-up: Podiatry for right foot, follow-up with me as needed for left knee    Please call [phone]   Ask for my team if you have any questions or concerns    If you have not yet received the influenza vaccine but would like to get one, please call  [phone] or you can schedule via [employer] App    It was great seeing you again today!    Armand Elizabeth MD, CAQSM     [facility] Injection Discharge Instructions    Procedure: right talonavicular joint    You may shower, however avoid swimming, tub baths or hot tubs for 24 hours following your procedure  You may have a mild to moderate increase in pain for several days following the injection.  It may take up to 14 days for the steroid medication to start working although you may feel the effect as early as a few days after the procedure.  You may use ice packs for 10-15 minutes, 3 to 4 times a day at the injection site for comfort  You may use anti-inflammatory medications (such as Ibuprofen or Aleve or Advil) or Tylenol for pain control if necessary  If you were fasting, you may resume your normal diet and medications after the procedure  If you have diabetes, check your blood sugar more  frequently than usual as your blood sugar may be higher than normal for 10-14 days following a steroid injection. Contact your doctor who manages your diabetes if your blood sugar is higher than usual    If you experience any of the following, call [facility] @ [phone] or [phone]  -Fever over 100 degree F  -Swelling, bleeding, redness, drainage, warmth at the injection site  - New or worsening pain

## 2025-04-30 ENCOUNTER — ANCILLARY PROCEDURE (OUTPATIENT)
Dept: GENERAL RADIOLOGY | Facility: CLINIC | Age: 60
End: 2025-04-30
Attending: PODIATRIST
Payer: COMMERCIAL

## 2025-04-30 ENCOUNTER — OFFICE VISIT (OUTPATIENT)
Dept: PODIATRY | Facility: CLINIC | Age: 60
End: 2025-04-30
Attending: FAMILY MEDICINE
Payer: COMMERCIAL

## 2025-04-30 VITALS — BODY MASS INDEX: 26.52 KG/M2 | WEIGHT: 175 LBS | HEIGHT: 68 IN

## 2025-04-30 DIAGNOSIS — M19.071 ARTHRITIS OF RIGHT FOOT: ICD-10-CM

## 2025-04-30 DIAGNOSIS — M19.071 ARTHRITIS OF RIGHT FOOT: Primary | ICD-10-CM

## 2025-04-30 PROCEDURE — 73630 X-RAY EXAM OF FOOT: CPT | Mod: TC | Performed by: RADIOLOGY

## 2025-04-30 PROCEDURE — 1125F AMNT PAIN NOTED PAIN PRSNT: CPT | Performed by: PODIATRIST

## 2025-04-30 PROCEDURE — 99203 OFFICE O/P NEW LOW 30 MIN: CPT | Performed by: PODIATRIST

## 2025-04-30 ASSESSMENT — PAIN SCALES - GENERAL: PAINLEVEL_OUTOF10: MILD PAIN (3)

## 2025-04-30 NOTE — NURSING NOTE
"Chief Complaint   Patient presents with    Consult     Right foot arthritis       Initial Ht 1.715 m (5' 7.5\")   Wt 79.4 kg (175 lb)   BMI 27.00 kg/m   Estimated body mass index is 27 kg/m  as calculated from the following:    Height as of this encounter: 1.715 m (5' 7.5\").    Weight as of this encounter: 79.4 kg (175 lb).  Medications and allergies reviewed.      Anisha NAGY MA    "

## 2025-04-30 NOTE — LETTER
4/30/2025      Anthony De La Torre  [address] [facility] [id number]      Dear Colleague,    Thank you for referring your patient, Anthony De La Torre, to the [facility] [place]. Please see a copy of my visit note below.    PATIENT HISTORY:  Anthony De La Torre is a 59 year old male who presents to clinic in consultation at the request of  Armand Elizabeth M.D. with a chief complaint of arthritis.  The patient is seen with their wife.  The patient relates the pain is primarily located around the right foot.  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  The patient has previously tried different shoes, and injection with little relief.  The patient is retired.  Any previous notes and studies that pertain to the patient's condition were reviewed.    Pertinent medical, surgical and family history was reviewed in the [facility] chart.    Past Medical History:   Past Medical History:   Diagnosis Date     Lumbago 6/2/2008     NO ACTIVE PROBLEMS        Medications:   Current Outpatient Medications:      albuterol (PROAIR HFA/PROVENTIL HFA/VENTOLIN HFA) 108 (90 Base) MCG/ACT inhaler, Inhale 2 puffs into the lungs every 6 hours as needed for shortness of breath., Disp: 18 g, Rfl: 1     amLODIPine (NORVASC) 2.5 MG tablet, TAKE 1 TABLET DAILY, Disp: 90 tablet, Rfl: 3     ascorbic acid (VITAMIN C) 1000 MG TABS, Take 1,000 mg by mouth daily., Disp: , Rfl:      aspirin 81 MG chewable tablet, Take 81 mg by mouth daily., Disp: , Rfl:      Bioflavonoid Products (BIOFLEX PO), Take 1-2 tablets by mouth daily., Disp: , Rfl:      buPROPion (WELLBUTRIN SR) 150 MG 12 hr tablet, Take 1 tablet (150 mg) by mouth 2 times daily, Disp: 60 tablet, Rfl: 1     cetirizine HCl 10 MG CAPS, Take 25 mg by mouth daily as needed, Disp: , Rfl:      fish oil-omega-3 fatty acids 1000 MG capsule, Take 2 g by mouth  daily., Disp: , Rfl:      fluticasone (FLONASE) 50 MCG/ACT nasal spray, , Disp: , Rfl:      hydrochlorothiazide (HYDRODIURIL) 25 MG tablet, TAKE 1 TABLET DAILY, Disp: 90 tablet, Rfl: 3     losartan (COZAAR) 100 MG tablet, TAKE 1 TABLET DAILY, Disp: 90 tablet, Rfl: 3     rosuvastatin (CRESTOR) 20 MG tablet, Take 1 tablet (20 mg) by mouth daily., Disp: 90 tablet, Rfl: 1     sildenafil (REVATIO) 20 MG tablet, 2-5 tabs prior to sex, Disp: 90 tablet, Rfl: 3     Allergies:    Allergies   Allergen Reactions     Nkda [No Known Drug Allergy]      Seasonal Allergies          LOWER EXTREMITY PHYSICAL EXAM    Dermatologic: Skin is intact to right lower extremity without significant lesions, rash or abrasion.        Vascular: DP & PT pulses are intact & regular on the right.   CFT and skin temperature is normal to the right lower extremity.     Neurologic: Lower extremity sensation is intact to light touch.  No evidence of weakness in the right lower extremity.        Musculoskeletal: Patient is ambulatory without assistive device or brace.  No gross ankle deformity noted.  No foot or ankle joint effusion is noted.  Noted pain with limited evaluation of the talonavicular joint on the right.  Noted pain with limited ankle dorsiflexion on the right.    Diagnostics:  Radiographs included three views of the right foot demonstrating moderate to severe degenerative changes to the talonavicular joint on the right foot.  Noted anterior spurring of the right ankle.  No cortical erosions or periosteal elevation.  All joint margins appear stable.  There is no apparent fracture or tumor formation noted.  There is no evidence of foreign body.  The images were independently reviewed by myself along with the patient explaining the findings.      ASSESSMENT / PLAN:     ICD-10-CM    1. Arthritis of right foot  M19.071 Orthopedic  Referral     XR Foot Right G/E 3 Views    Talonavicular joint          I have explained to Anthony about the  conditions.  We discussed the underlying contributing factors to the condition as well as both conservative and surgical treatment options along with expected length of recovery.  At this time, the patient was educated on the importance of offloading supportive shoes and other devices.  I demonstrated to the patient calf stretches to perform every hour daily until symptoms resolve.  After symptoms resolve, the patient was advised to perform the stretches 3 times daily to prevent future recurrence.  The patient was instructed to perform warm soaks with Epson salt after which to also apply over-the-counter Voltaren gel to deeply massage the injured tissue.  The patient was instructed to do this on a daily basis until symptoms resolve.   The patient may return in four weeks for reevaluation to determine if any further treatment will be needed.      Anthony verbalized agreement with and understanding of the rational for the diagnosis and treatment plan.  All questions were answered to best of my ability and the patient's satisfaction. The patient was advised to contact the clinic with any questions that may arise after the clinic visit.      Disclaimer: This note consists of symbols derived from keyboarding, dictation and/or voice recognition software. As a result, there may be errors in the script that have gone undetected. Please consider this when interpreting information found in this chart.       YAKOV Gonsalez.P.M., F.A.C.F.A.S.      Again, thank you for allowing me to participate in the care of your patient.        Sincerely,        Stanley Mora DPM    Electronically signed

## 2025-04-30 NOTE — PROGRESS NOTES
PATIENT HISTORY:  Anthony De La Torre is a 59 year old male who presents to clinic in consultation at the request of  Armand Elizabeth M.D. with a chief complaint of arthritis.  The patient is seen with their wife.  The patient relates the pain is primarily located around the right foot.  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  The patient has previously tried different shoes, and injection with little relief.  The patient is retired.  Any previous notes and studies that pertain to the patient's condition were reviewed.    Pertinent medical, surgical and family history was reviewed in the [facility] chart.    Past Medical History:   Past Medical History:   Diagnosis Date    Lumbago 6/2/2008    NO ACTIVE PROBLEMS        Medications:   Current Outpatient Medications:     albuterol (PROAIR HFA/PROVENTIL HFA/VENTOLIN HFA) 108 (90 Base) MCG/ACT inhaler, Inhale 2 puffs into the lungs every 6 hours as needed for shortness of breath., Disp: 18 g, Rfl: 1    amLODIPine (NORVASC) 2.5 MG tablet, TAKE 1 TABLET DAILY, Disp: 90 tablet, Rfl: 3    ascorbic acid (VITAMIN C) 1000 MG TABS, Take 1,000 mg by mouth daily., Disp: , Rfl:     aspirin 81 MG chewable tablet, Take 81 mg by mouth daily., Disp: , Rfl:     Bioflavonoid Products (BIOFLEX PO), Take 1-2 tablets by mouth daily., Disp: , Rfl:     buPROPion (WELLBUTRIN SR) 150 MG 12 hr tablet, Take 1 tablet (150 mg) by mouth 2 times daily, Disp: 60 tablet, Rfl: 1    cetirizine HCl 10 MG CAPS, Take 25 mg by mouth daily as needed, Disp: , Rfl:     fish oil-omega-3 fatty acids 1000 MG capsule, Take 2 g by mouth daily., Disp: , Rfl:     fluticasone (FLONASE) 50 MCG/ACT nasal spray, , Disp: , Rfl:     hydrochlorothiazide (HYDRODIURIL) 25 MG tablet, TAKE 1 TABLET DAILY, Disp: 90 tablet, Rfl: 3    losartan (COZAAR) 100 MG tablet, TAKE 1 TABLET DAILY, Disp: 90 tablet,  Rfl: 3    rosuvastatin (CRESTOR) 20 MG tablet, Take 1 tablet (20 mg) by mouth daily., Disp: 90 tablet, Rfl: 1    sildenafil (REVATIO) 20 MG tablet, 2-5 tabs prior to sex, Disp: 90 tablet, Rfl: 3     Allergies:    Allergies   Allergen Reactions    Nkda [No Known Drug Allergy]     Seasonal Allergies          LOWER EXTREMITY PHYSICAL EXAM    Dermatologic: Skin is intact to right lower extremity without significant lesions, rash or abrasion.        Vascular: DP & PT pulses are intact & regular on the right.   CFT and skin temperature is normal to the right lower extremity.     Neurologic: Lower extremity sensation is intact to light touch.  No evidence of weakness in the right lower extremity.        Musculoskeletal: Patient is ambulatory without assistive device or brace.  No gross ankle deformity noted.  No foot or ankle joint effusion is noted.  Noted pain with limited evaluation of the talonavicular joint on the right.  Noted pain with limited ankle dorsiflexion on the right.    Diagnostics:  Radiographs included three views of the right foot demonstrating moderate to severe degenerative changes to the talonavicular joint on the right foot.  Noted anterior spurring of the right ankle.  No cortical erosions or periosteal elevation.  All joint margins appear stable.  There is no apparent fracture or tumor formation noted.  There is no evidence of foreign body.  The images were independently reviewed by myself along with the patient explaining the findings.      ASSESSMENT / PLAN:     ICD-10-CM    1. Arthritis of right foot  M19.071 Orthopedic  Referral     XR Foot Right G/E 3 Views    Talonavicular joint          I have explained to Anthony about the conditions.  We discussed the underlying contributing factors to the condition as well as both conservative and surgical treatment options along with expected length of recovery.  At this time, the patient was educated on the importance of offloading supportive  shoes and other devices.  I demonstrated to the patient calf stretches to perform every hour daily until symptoms resolve.  After symptoms resolve, the patient was advised to perform the stretches 3 times daily to prevent future recurrence.  The patient was instructed to perform warm soaks with Epson salt after which to also apply over-the-counter Voltaren gel to deeply massage the injured tissue.  The patient was instructed to do this on a daily basis until symptoms resolve.   The patient may return in four weeks for reevaluation to determine if any further treatment will be needed.      Anthony verbalized agreement with and understanding of the rational for the diagnosis and treatment plan.  All questions were answered to best of my ability and the patient's satisfaction. The patient was advised to contact the clinic with any questions that may arise after the clinic visit.      Disclaimer: This note consists of symbols derived from keyboarding, dictation and/or voice recognition software. As a result, there may be errors in the script that have gone undetected. Please consider this when interpreting information found in this chart.       ANSHUL Mora D.P.M., F.MEGAN.C.F.A.S.

## 2025-07-14 DIAGNOSIS — I10 BENIGN ESSENTIAL HYPERTENSION: ICD-10-CM

## 2025-07-14 RX ORDER — AMLODIPINE BESYLATE 2.5 MG/1
2.5 TABLET ORAL DAILY
Qty: 90 TABLET | Refills: 1 | Status: SHIPPED | OUTPATIENT
Start: 2025-07-14

## (undated) DEVICE — ENDO SNARE EXACTO COLD 9MM LOOP 2.4MMX230CM 00711115

## (undated) RX ORDER — LIDOCAINE HYDROCHLORIDE 10 MG/ML
INJECTION, SOLUTION EPIDURAL; INFILTRATION; INTRACAUDAL; PERINEURAL
Status: DISPENSED
Start: 2017-02-13